# Patient Record
Sex: FEMALE | Race: WHITE | NOT HISPANIC OR LATINO | Employment: FULL TIME | URBAN - METROPOLITAN AREA
[De-identification: names, ages, dates, MRNs, and addresses within clinical notes are randomized per-mention and may not be internally consistent; named-entity substitution may affect disease eponyms.]

---

## 2017-01-24 ENCOUNTER — OFFICE VISIT (OUTPATIENT)
Dept: FAMILY MEDICINE | Facility: CLINIC | Age: 52
End: 2017-01-24
Payer: COMMERCIAL

## 2017-01-24 VITALS
HEART RATE: 84 BPM | OXYGEN SATURATION: 97 % | SYSTOLIC BLOOD PRESSURE: 131 MMHG | DIASTOLIC BLOOD PRESSURE: 78 MMHG | BODY MASS INDEX: 33.22 KG/M2 | WEIGHT: 191 LBS

## 2017-01-24 DIAGNOSIS — F41.8 DEPRESSION WITH ANXIETY: Primary | ICD-10-CM

## 2017-01-24 PROCEDURE — 99214 OFFICE O/P EST MOD 30 MIN: CPT | Performed by: PHYSICIAN ASSISTANT

## 2017-01-24 RX ORDER — CITALOPRAM HYDROBROMIDE 20 MG/1
TABLET ORAL
Qty: 30 TABLET | Refills: 1 | Status: SHIPPED | OUTPATIENT
Start: 2017-01-24 | End: 2017-03-25

## 2017-01-24 ASSESSMENT — ANXIETY QUESTIONNAIRES
7. FEELING AFRAID AS IF SOMETHING AWFUL MIGHT HAPPEN: NOT AT ALL
5. BEING SO RESTLESS THAT IT IS HARD TO SIT STILL: NOT AT ALL
3. WORRYING TOO MUCH ABOUT DIFFERENT THINGS: NOT AT ALL
GAD7 TOTAL SCORE: 3
1. FEELING NERVOUS, ANXIOUS, OR ON EDGE: NOT AT ALL
2. NOT BEING ABLE TO STOP OR CONTROL WORRYING: NOT AT ALL
6. BECOMING EASILY ANNOYED OR IRRITABLE: NEARLY EVERY DAY
IF YOU CHECKED OFF ANY PROBLEMS ON THIS QUESTIONNAIRE, HOW DIFFICULT HAVE THESE PROBLEMS MADE IT FOR YOU TO DO YOUR WORK, TAKE CARE OF THINGS AT HOME, OR GET ALONG WITH OTHER PEOPLE: NOT DIFFICULT AT ALL

## 2017-01-24 ASSESSMENT — PATIENT HEALTH QUESTIONNAIRE - PHQ9: 5. POOR APPETITE OR OVEREATING: NOT AT ALL

## 2017-01-24 NOTE — MR AVS SNAPSHOT
After Visit Summary   1/24/2017    Yue Mays    MRN: [id number]           Patient Information     Date Of Birth          1965        Visit Information        Provider Department      1/24/2017 6:40 PM Kaylynn Cameron PA-C [facility]        Today's Diagnoses     Depression with anxiety    -  1       Care Instructions    Take 300 mg wellbutrin for 1-2 weeks, then decrease to 150 mg for 2-3 weeks then stop if feeling okay.    Start celexa as prescribed.         Follow-ups after your visit        Who to contact     If you have questions or need follow up information about today's clinic visit or your schedule please contact [facility] directly at [phone].  Normal or non-critical lab and imaging results will be communicated to you by Cloakroom[employer], letter or phone within 4 business days after the clinic has received the results. If you do not hear from us within 7 days, please contact the clinic through Cloakroom[employer] or phone. If you have a critical or abnormal lab result, we will notify you by phone as soon as possible.  Submit refill requests through [employer] Direct or call your pharmacy and they will forward the refill request to us. Please allow 3 business days for your refill to be completed.          Additional Information About Your Visit        MyChart Information     [employer] gives you secure access to your electronic health record. If you see a primary care provider, you can also send messages to your care team and make appointments. If you have questions, please call your primary care clinic.  If you do not have a primary care provider, please call [phone] and they will assist you.        Care EveryWhere ID     This is your Care EveryWhere ID. This could be used by other organizations to access your [place] medical records  [id number]        Your Vitals Were     Pulse Pulse Oximetry                84 97%           Blood Pressure from Last 3 Encounters:    01/24/17 131/78   07/11/16 134/83   06/16/16 123/79    Weight from Last 3 Encounters:   01/24/17 191 lb (86.637 kg)   07/11/16 199 lb (90.266 kg)   06/16/16 196 lb 6.4 oz (89.086 kg)              Today, you had the following     No orders found for display         Today's Medication Changes          These changes are accurate as of: 1/24/17  7:17 PM.  If you have any questions, ask your nurse or doctor.               Start taking these medicines.        Dose/Directions    citalopram 20 MG tablet   Commonly known as:  celeXA   Used for:  Depression with anxiety        Take 1/2 tablet (10 mg) for 1-2 weeks, then increase to 1 tablet orally daily   Quantity:  30 tablet   Refills:  1         These medicines have changed or have updated prescriptions.        Dose/Directions    * buPROPion 300 MG 24 hr tablet   Commonly known as:  WELLBUTRIN XL   This may have changed:  Another medication with the same name was removed. Continue taking this medication, and follow the directions you see here.   Used for:  Depression with anxiety        Dose:  300 mg   Take 1 tablet (300 mg) by mouth every morning   Quantity:  90 tablet   Refills:  0       * buPROPion 150 MG 24 hr tablet   Commonly known as:  WELLBUTRIN XL   This may have changed:  Another medication with the same name was removed. Continue taking this medication, and follow the directions you see here.   Used for:  Depression with anxiety        Dose:  150 mg   Take 1 tablet (150 mg) by mouth every morning Take with 300 for total of 450.   Quantity:  90 tablet   Refills:  0       * Notice:  This list has 2 medication(s) that are the same as other medications prescribed for you. Read the directions carefully, and ask your doctor or other care provider to review them with you.         Where to get your medicines      These medications were sent to [facility]/pharmacy #[id number] - [place], [place] - [address] [place] [address],  AT CORNER OF [facility]  [address] [place] [address], ,  [facility] [address]     Phone:  [phone]    - citalopram 20 MG tablet             Primary Care Provider Office Phone # Fax #    Kaylynn Cameron PA-C [phone]       [facility] [address] [facility] [address]        Thank you!     Thank you for choosing [facility]  for your care. Our goal is always to provide you with excellent care. Hearing back from our patients is one way we can continue to improve our services. Please take a few minutes to complete the written survey that you may receive in the mail after your visit with us. Thank you!             Your Updated Medication List - Protect others around you: Learn how to safely use, store and throw away your medicines at www.disposemymeds.org.          This list is accurate as of: 1/24/17  7:17 PM.  Always use your most recent med list.                   Brand Name Dispense Instructions for use    * buPROPion 300 MG 24 hr tablet    WELLBUTRIN XL    90 tablet    Take 1 tablet (300 mg) by mouth every morning       * buPROPion 150 MG 24 hr tablet    WELLBUTRIN XL    90 tablet    Take 1 tablet (150 mg) by mouth every morning Take with 300 for total of 450.       citalopram 20 MG tablet    celeXA    30 tablet    Take 1/2 tablet (10 mg) for 1-2 weeks, then increase to 1 tablet orally daily       * Notice:  This list has 2 medication(s) that are the same as other medications prescribed for you. Read the directions carefully, and ask your doctor or other care provider to review them with you.

## 2017-01-25 ASSESSMENT — PATIENT HEALTH QUESTIONNAIRE - PHQ9: SUM OF ALL RESPONSES TO PHQ QUESTIONS 1-9: 5

## 2017-01-25 ASSESSMENT — ANXIETY QUESTIONNAIRES: GAD7 TOTAL SCORE: 3

## 2017-01-25 NOTE — PATIENT INSTRUCTIONS
Take 300 mg wellbutrin for 1-2 weeks, then decrease to 150 mg for 2-3 weeks then stop if feeling okay.    Start celexa as prescribed.

## 2017-01-25 NOTE — NURSING NOTE
"Chief Complaint   Patient presents with     Anxiety       Initial /78 mmHg  Pulse 84  Wt 191 lb (86.637 kg)  SpO2 97% Estimated body mass index is 33.22 kg/(m^2) as calculated from the following:    Height as of 6/16/16: 5' 3.58\" (1.615 m).    Weight as of this encounter: 191 lb (86.637 kg)..  BP completed using cuff size: lisbeth Palomino M.A.  PHQ-9 score:    PHQ-9 SCORE 1/24/2017   Total Score -   Total Score MyChart -   Total Score 5     NAIN-7 SCORE 1/20/2015 6/16/2016 1/24/2017   Total Score 0 - -   Total Score - 5 3                 "

## 2017-01-25 NOTE — PROGRESS NOTES
SUBJECTIVE:                                                    Yue Mays is a 51 year old female who presents to clinic today for the following health issues:    Recheck anxiety:    Medication Followup of wellbutrin    Taking Medication as prescribed: yes    Side Effects:  None    Medication Helping Symptoms: feel like it is not helping symptoms anymore     Previously on wellbutrin, celexa, lexapro. lexapro made her tired.  felt like Wellbutrin helped her the most in the past.   This time not helping much. celexa helped but also made her tired for the first 2 months. Wants to try again however.  Sleeping okay. No drug abuse.     Denies suicidal or homicidal thoughts.  Patient instructed to go to the emergency room or call 911 if these occur.    Has used to therapy in the past, was helpful at that time.    Does not feel therapy would be helpful now.     Has not met with psychiatry previously.           Problem list and histories reviewed & adjusted, as indicated.  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     Cystocele, midline     Urgency of urination     Female stress incontinence     Cystocele     Papanicolaou smear of cervix with low grade squamous intraepithelial lesion (LGSIL)     Depression with anxiety     Past Surgical History   Procedure Laterality Date     Colporrhaphy anterior  8/21/2012     Procedure: COLPORRHAPHY ANTERIOR;  Cardinal Ligament Repair with Anterior Colporrhaphy and a Midurethral Sling;  Surgeon: Valentine Vrea MD;  Location: [facility] OR     Sling transvaginal  8/21/2012     Procedure: SLING TRANSVAGINAL;;  Surgeon: Valentine Vera MD;  Location: [facility] OR     Sling transvaginal  11/12/2012     Procedure: SLING TRANSVAGINAL;  Cysto with TVT (transobturator approach);  Surgeon: Valentine Vera MD;  Location: [facility] OR     Biopsy  1989     had  a 2 colposcopies for moderate displaysia     Genitourinary surgery  8/2013     3 slings put in     Colonoscopy N/A 12/31/2015     Procedure: COLONOSCOPY;  Surgeon: Arpit Tony MD;  Location: [facility] OR     Colonoscopy with co2 insufflation N/A 12/31/2015     Procedure: COLONOSCOPY WITH CO2 INSUFFLATION;  Surgeon: Arpit Tony MD;  Location: [facility] OR     Colonoscopy N/A 12/31/2015     Procedure: COMBINED COLONOSCOPY, SINGLE OR MULTIPLE BIOPSY/POLYPECTOMY BY BIOPSY;  Surgeon: Arpit Tony MD;  Location: [facility] OR       Social History   Substance Use Topics     Smoking status: Never Smoker      Smokeless tobacco: Never Used     Alcohol Use: 0.0 oz/week     0 Standard drinks or equivalent per week      Comment: minimal - maybe once a month     Family History   Problem Relation Age of Onset     CANCER Mother      hodgkins     Arthritis Father      DIABETES Father      Hypertension Father      Breast Cancer Mother      early 30s      Depression/Anxiety Father      undiagnosed     Depression/Anxiety Sister      undiagnosed         Current Outpatient Prescriptions   Medication Sig Dispense Refill     citalopram (CELEXA) 20 MG tablet Take 1/2 tablet (10 mg) for 1-2 weeks, then increase to 1 tablet orally daily 30 tablet 1     buPROPion (WELLBUTRIN XL) 150 MG 24 hr tablet Take 1 tablet (150 mg) by mouth every morning Take with 300 for total of 450. 90 tablet 0     buPROPion (WELLBUTRIN XL) 300 MG 24 hr tablet Take 1 tablet (300 mg) by mouth every morning 90 tablet 0     Allergies   Allergen Reactions     Nkda [No Known Drug Allergies]        ROS:  Constitutional, HEENT, cardiovascular, pulmonary, gi and gu systems are negative, except as otherwise noted.    OBJECTIVE:                                                    /78 mmHg  Pulse 84  Wt 191 lb (86.637 kg)  SpO2 97%  Body mass index is 33.22 kg/(m^2).  GENERAL: healthy, alert and no distress  RESP: lungs clear to auscultation - no rales,  rhonchi or wheezes  CV: regular rate and rhythm, normal S1 S2, no S3 or S4, no murmur, click or rub, no peripheral edema and peripheral pulses strong  MS: no gross musculoskeletal defects noted, no edema  NEURO: Normal strength and tone, mentation intact and speech normal  PSYCH: mentation appears normal, affect normal/bright    Diagnostic Test Results:  none      ASSESSMENT/PLAN:                                                    ASSESSMENT / PLAN:  (F41.8) Depression with anxiety  (primary encounter diagnosis)  Comment: worsening on wellbutrin.  Suggested adding celexa, patient would prefer to taper off wellbutrin.  Taper as below.  Taper up on celexa. Expectations and side effects discussed. Recheck via my chart in 3 weeks, sooner if needed.  Recheck in clinic in 2 months unless doing very well on this new regime.   Plan: citalopram (CELEXA) 20 MG tablet            Patient Instructions   Take 300 mg wellbutrin for 1-2 weeks, then decrease to 150 mg for 2-3 weeks then stop if feeling okay.    Start celexa as prescribed.         Kaylynn Cameron PA-C  [facility]

## 2017-03-25 DIAGNOSIS — F41.8 DEPRESSION WITH ANXIETY: ICD-10-CM

## 2017-03-28 NOTE — TELEPHONE ENCOUNTER
Could help possibly. If wanting a dose change please have her start an e-visit with me to do so. Thanksradha

## 2017-03-28 NOTE — TELEPHONE ENCOUNTER
Pt completed PHQ-9 with score of 4.  Pt feels like her medication is working.  Pt states her only concern is she has a lack of motivation at home, she does ok getting to work and working but it is hard to do things at home.  Pt asking if she needs med increase to help this?  Marcia Rosas RN

## 2017-03-29 RX ORDER — CITALOPRAM HYDROBROMIDE 20 MG/1
TABLET ORAL
Qty: 90 TABLET | Refills: 1 | Status: SHIPPED | OUTPATIENT
Start: 2017-03-29 | End: 2017-10-07

## 2017-03-29 ASSESSMENT — PATIENT HEALTH QUESTIONNAIRE - PHQ9: SUM OF ALL RESPONSES TO PHQ QUESTIONS 1-9: 4

## 2017-03-29 NOTE — TELEPHONE ENCOUNTER
Pt notified of provider message as written.  Pt verbalized good understanding.  Marcia Rosas RN

## 2017-05-19 ENCOUNTER — TELEPHONE (OUTPATIENT)
Dept: FAMILY MEDICINE | Facility: CLINIC | Age: 52
End: 2017-05-19

## 2017-05-19 DIAGNOSIS — Z11.3 SCREEN FOR STD (SEXUALLY TRANSMITTED DISEASE): Primary | ICD-10-CM

## 2017-05-19 NOTE — TELEPHONE ENCOUNTER
Pt would like to know if she can have lab orders for HIV testing without coming in for an appointment.  Okay to leave message.

## 2017-05-21 NOTE — TELEPHONE ENCOUNTER
We can do hiv testing with lab only appt, but if anything is positive then we will have her f/u.  Does she want full blood panel (hepatitis, etc.) with it also? And chlamydia/gonorrohea?    Kaylynn Cameron PA-C

## 2017-05-22 DIAGNOSIS — Z11.3 SCREEN FOR STD (SEXUALLY TRANSMITTED DISEASE): ICD-10-CM

## 2017-05-22 PROCEDURE — 87340 HEPATITIS B SURFACE AG IA: CPT | Performed by: PHYSICIAN ASSISTANT

## 2017-05-22 PROCEDURE — 36415 COLL VENOUS BLD VENIPUNCTURE: CPT | Performed by: PHYSICIAN ASSISTANT

## 2017-05-22 PROCEDURE — 86803 HEPATITIS C AB TEST: CPT | Performed by: PHYSICIAN ASSISTANT

## 2017-05-22 PROCEDURE — 87389 HIV-1 AG W/HIV-1&-2 AB AG IA: CPT | Performed by: PHYSICIAN ASSISTANT

## 2017-05-22 NOTE — TELEPHONE ENCOUNTER
Called and informed patient of Kaylynn's message. Patient said that she an a friend had a tattoo at the same place/day. He friend tested positive for HIV, that is why she is requesting. I pended hep C and GC/chlamydia, please order HIV, was not sure which to order. Please order any other tests that you think she may need, she would like what ever you think would be helpful. Patient is scheduled for a lab appointment today.Yee Gaona MA/TC

## 2017-05-23 LAB
HBV SURFACE AG SERPL QL IA: NONREACTIVE
HCV AB SERPL QL IA: NORMAL
HIV 1+2 AB+HIV1 P24 AG SERPL QL IA: NORMAL

## 2017-05-24 NOTE — PROGRESS NOTES
Rachel Turner,       Your recent test results are attached, if you have any questions or concerns please feel free to contact me via e-mail or call [phone].  Negative HIV and hepatitis panel.       Sincerely,  Kaylynn Cameron PA-C

## 2017-09-07 ENCOUNTER — TELEPHONE (OUTPATIENT)
Dept: FAMILY MEDICINE | Facility: CLINIC | Age: 52
End: 2017-09-07

## 2017-09-22 ENCOUNTER — RADIANT APPOINTMENT (OUTPATIENT)
Dept: MAMMOGRAPHY | Facility: CLINIC | Age: 52
End: 2017-09-22
Payer: COMMERCIAL

## 2017-09-22 DIAGNOSIS — Z12.31 VISIT FOR SCREENING MAMMOGRAM: ICD-10-CM

## 2017-09-22 PROCEDURE — G0202 SCR MAMMO BI INCL CAD: HCPCS | Mod: TC

## 2017-10-07 DIAGNOSIS — F41.8 DEPRESSION WITH ANXIETY: ICD-10-CM

## 2017-10-08 ENCOUNTER — HEALTH MAINTENANCE LETTER (OUTPATIENT)
Age: 52
End: 2017-10-08

## 2017-10-10 RX ORDER — CITALOPRAM HYDROBROMIDE 20 MG/1
20 TABLET ORAL DAILY
Qty: 90 TABLET | Refills: 0 | Status: SHIPPED | OUTPATIENT
Start: 2017-10-10 | End: 2017-11-20

## 2017-10-10 ASSESSMENT — PATIENT HEALTH QUESTIONNAIRE - PHQ9: SUM OF ALL RESPONSES TO PHQ QUESTIONS 1-9: 4

## 2017-10-10 NOTE — TELEPHONE ENCOUNTER
Pt completed PHQ-9 with score of 4, pt states she is not sure she is doing as well as she could be, her daughter is on a different med and she may want to try that instead.  Advised ov to discuss.  Pt states she needs physical also, advised can do both at same appointment.  Marcia Rosas RN

## 2017-11-16 NOTE — PATIENT INSTRUCTIONS
Take half tablet celexa for 2 weeks then stop  Start fluoxetine in 1 week   Recheck 2 months, sooner if needed      Preventive Health Recommendations  Female Ages 50 - 64    Yearly exam: See your health care provider every year in order to  o Review health changes.   o Discuss preventive care.    o Review your medicines if your doctor has prescribed any.      Get a Pap test every three years (unless you have an abnormal result and your provider advises testing more often).    If you get Pap tests with HPV test, you only need to test every 5 years, unless you have an abnormal result.     You do not need a Pap test if your uterus was removed (hysterectomy) and you have not had cancer.    You should be tested each year for STDs (sexually transmitted diseases) if you're at risk.     Have a mammogram every 1 to 2 years.    Have a colonoscopy at age 50, or have a yearly FIT test (stool test). These exams screen for colon cancer.      Have a cholesterol test every 5 years, or more often if advised.    Have a diabetes test (fasting glucose) every three years. If you are at risk for diabetes, you should have this test more often.     If you are at risk for osteoporosis (brittle bone disease), think about having a bone density scan (DEXA).    Shots: Get a flu shot each year. Get a tetanus shot every 10 years.    Nutrition:     Eat at least 5 servings of fruits and vegetables each day.    Eat whole-grain bread, whole-wheat pasta and brown rice instead of white grains and rice.    Talk to your provider about Calcium and Vitamin D.     Lifestyle    Exercise at least 150 minutes a week (30 minutes a day, 5 days a week). This will help you control your weight and prevent disease.    Limit alcohol to one drink per day.    No smoking.     Wear sunscreen to prevent skin cancer.     See your dentist every six months for an exam and cleaning.    See your eye doctor every 1 to 2 years.

## 2017-11-16 NOTE — PROGRESS NOTES
SUBJECTIVE:   CC: Yue Mays is an 52 year old woman who presents for preventive health visit.     Healthy Habits:    Answers for HPI/ROS submitted by the patient on 11/15/2017   Annual Exam:  Getting at least 3 servings of Calcium per day:: Yes  Bi-annual eye exam:: Yes  Dental care twice a year:: Yes  Sleep apnea or symptoms of sleep apnea:: None  Diet:: Regular (no restrictions)  Frequency of exercise:: 4-5 days/week  Taking medications regularly:: Yes  Medication side effects:: Not applicable  Additional concerns today:: YES  PHQ-2 Score: 0  Duration of exercise:: 30-45 minutes    Pt is due for PAP, have not had it done elsewhere. Has light period, she still wants pap done today.  She is aware it may have to be recollected if she has too much bleeding during collection.     Mammogram done, had normal fasting labs last year, colonoscopy is up to date.  She has several concerns she wants to address today, and is upset that we have limited time.  She wants both her physical and to address her several concerns.  She is aware that this will be an additional cost.       1)  Ongoing Depression:  She feels it is worsening.  Noticing she is having more bad days where she feels down.  Previously celexa 20 mg worked well for her but not anymore she feels.  She does not want to increase the dose but rather wants to try something else.  Has also previously been on wellbutrin.   Daughter is on fluoxetine and it works well. Patient is interested in trying this.      Denies suicidal or homicidal thoughts.  Patient instructed to go to the emergency room or call 911 if these occur.     2) early satiety-patient is morbidly obese.  She complains that since she has been trying to eat healthier (more vegetables and fiber) she gets more full faster.  We discussed this could be due to healthier foods.  She denies heartburn or gerd. No fevers, chills, night sweats, or unintentional weight loss.  No h/o cancer or alcohol abuse. No  blood in stool.  Up to date on colonoscopy.  Sometimes she gets diarrhea.       3)  shortness of breath when going up stairs or walking x a few months no chest pain or jaw pain or sweating.  No arm pain.  No recent long car trip or airplane trips  No lower extremity edema, she would be low risk for PE.  Nonsmoker.  No cough. No palpitations.  No cardiac or lung history for patient.                   Today's PHQ-2 Score: PHQ-2 ( 1999 Pfizer) 11/15/2017 6/16/2016   Q1: Little interest or pleasure in doing things 0 3   Q2: Feeling down, depressed or hopeless 0 3   PHQ-2 Score 0 6   Q1: Little interest or pleasure in doing things Not at all -   Q2: Feeling down, depressed or hopeless Not at all -   PHQ-2 Score 0 -         Abuse: Current or Past(Physical, Sexual or Emotional)- No  Do you feel safe in your environment - Yes  Social History   Substance Use Topics     Smoking status: Never Smoker     Smokeless tobacco: Never Used     Alcohol use 0.0 oz/week      Comment: minimal - maybe once a month     The patient does not drink >3 drinks per day nor >7 drinks per week.    Reviewed orders with patient.  Reviewed health maintenance and updated orders accordingly - Yes  Labs reviewed in EPIC  BP Readings from Last 3 Encounters:   11/20/17 130/83   01/24/17 131/78   07/11/16 134/83    Wt Readings from Last 3 Encounters:   11/20/17 220 lb (99.8 kg)   01/24/17 191 lb (86.6 kg)   07/11/16 199 lb (90.3 kg)                  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     Cystocele, midline     Urgency of urination     Female stress incontinence     Cystocele     Papanicolaou smear of cervix with low grade squamous intraepithelial lesion (LGSIL)     Depression with anxiety     Past Surgical History:   Procedure Laterality Date     BIOPSY  1989    had a 2 colposcopies for moderate displaysia     COLONOSCOPY N/A 12/31/2015     Procedure: COLONOSCOPY;  Surgeon: Arpit Tony MD;  Location: [facility]     COLONOSCOPY N/A 12/31/2015    Procedure: COMBINED COLONOSCOPY, SINGLE OR MULTIPLE BIOPSY/POLYPECTOMY BY BIOPSY;  Surgeon: Arpit Tony MD;  Location: [facility]     COLONOSCOPY WITH CO2 INSUFFLATION N/A 12/31/2015    Procedure: COLONOSCOPY WITH CO2 INSUFFLATION;  Surgeon: Arpit Tony MD;  [facility]     COLPORRHAPHY ANTERIOR  8/21/2012    Procedure: COLPORRHAPHY ANTERIOR;  Cardinal Ligament Repair with Anterior Colporrhaphy and a Midurethral Sling;  Surgeon: Valentine Vera MD;  [facility]     GENITOURINARY SURGERY  8/2013    3 slings put in     SLING TRANSVAGINAL  8/21/2012    Procedure: SLING TRANSVAGINAL;;  Surgeon: Valentine Vera MD;  [facility]     SLING TRANSVAGINAL  11/12/2012    Procedure: SLING TRANSVAGINAL;  Cysto with TVT (transobturator approach);  Surgeon: Valentine Vera MD;  Location: [facility]       Social History   Substance Use Topics     Smoking status: Never Smoker     Smokeless tobacco: Never Used     Alcohol use 0.0 oz/week      Comment: minimal - maybe once a month     Family History   Problem Relation Age of Onset     CANCER Mother      hodgkins     Breast Cancer Mother      early 30s      Other Cancer Mother      Hodgkin's Disease, Breast Cancer     Arthritis Father      DIABETES Father      I don't recall if he had diabetes     Hypertension Father      Depression/Anxiety Father      undiagnosed     Depression/Anxiety Sister      undiagnosed     Depression Daughter      Anxiety Disorder Daughter      Depression Daughter      Anxiety Disorder Daughter          Current Outpatient Prescriptions   Medication Sig Dispense Refill     FLUoxetine (PROZAC) 10 MG capsule Take 1 capsule for 1 week daily then increase to 2 capsules daily for 2 weeks then increase to 3 capsules daily if tolerated 42 capsule 0     Allergies   Allergen Reactions     Nkda [No Known Drug Allergies]             Patient over age 50, mutual decision to screen reflected in health maintenance.      Pertinent mammograms are reviewed under the imaging tab.  History of abnormal Pap smear: YES - updated in Problem List and Health Maintenance accordingly    Reviewed and updated as needed this visit by clinical staff  Tobacco  Allergies  Meds  Med Hx  Surg Hx  Fam Hx  Soc Hx        Reviewed and updated as needed this visit by Provider        Past Medical History:   Diagnosis Date     Anxiety      ASCUS with positive high risk HPV cervical 6/16/16    Neg 16/18     Depression      Depressive disorder      H/O colposcopy with cervical biopsy 2/2015    LSIL     History of colposcopy 7/11/16    ECC - negative     LSIL (low grade squamous intraepithelial lesion) on Pap smear 1/2015    + HR HPV     Urinary incontinence       Past Surgical History:   Procedure Laterality Date     BIOPSY  1989    had a 2 colposcopies for moderate displaysia     COLONOSCOPY N/A 12/31/2015    Procedure: COLONOSCOPY;  Surgeon: Arpit Tony MD;  [facility]     COLONOSCOPY N/A 12/31/2015    Procedure: COMBINED COLONOSCOPY, SINGLE OR MULTIPLE BIOPSY/POLYPECTOMY BY BIOPSY;  Surgeon: Arpit Tony MD;  [facility]     COLONOSCOPY WITH CO2 INSUFFLATION N/A 12/31/2015    Procedure: COLONOSCOPY WITH CO2 INSUFFLATION;  Surgeon: Arpit Tony MD;  [facility]     COLPORRHAPHY ANTERIOR  8/21/2012    Procedure: COLPORRHAPHY ANTERIOR;  Cardinal Ligament Repair with Anterior Colporrhaphy and a Midurethral Sling;  Surgeon: Valentine Vera MD;  [facility]     GENITOURINARY SURGERY  8/2013    3 slings put in     SLING TRANSVAGINAL  8/21/2012    Procedure: SLING TRANSVAGINAL;;  Surgeon: Valentine Vera MD;  [facility]     SLING TRANSVAGINAL  11/12/2012    Procedure: SLING TRANSVAGINAL;  Cysto with TVT (transobturator approach);  Surgeon: Valentine Vera MD;  [facility]     Obstetric History    G4  "  P2   T0      L0     SAB0   TAB0   Ectopic0   Multiple0   Live Births0       # Outcome Date GA Lbr Ethan/2nd Weight Sex Delivery Anes PTL Lv   4             3             2 Para            1 Para               Obstetric Comments   2 miscarriages       ROS:  C: NEGATIVE for fever, chills, change in weight  I: NEGATIVE for worrisome rashes, moles or lesions  E: NEGATIVE for vision changes or irritation  ENT: NEGATIVE for ear, mouth and throat problems  B: NEGATIVE for masses, tenderness or discharge  CV: NEGATIVE for chest pain, palpitations or peripheral edema  GI: NEGATIVE for nausea, abdominal pain, heartburn, or change in bowel habits  : NEGATIVE for unusual urinary or vaginal symptoms. Periods are regular.  M: NEGATIVE for significant arthralgias or myalgia  N: NEGATIVE for weakness, dizziness or paresthesias    OBJECTIVE:   /83  Pulse 79  Temp 98.2  F (36.8  C) (Oral)  Ht 5' 3\" (1.6 m)  Wt 220 lb (99.8 kg)  SpO2 95%  Breastfeeding? No  BMI 38.97 kg/m2  EXAM:  GENERAL: healthy, alert and no distress  EYES: Eyes grossly normal to inspection, PERRL and conjunctivae and sclerae normal  HENT: ear canals and TM's normal, nose and mouth without ulcers or lesions  NECK: no adenopathy, no asymmetry, masses, or scars and thyroid normal to palpation  RESP: lungs clear to auscultation - no rales, rhonchi or wheezes  BREAST: normal without masses, tenderness or nipple discharge and no palpable axillary masses or adenopathy  CV: regular rate and rhythm, normal S1 S2, no S3 or S4, no murmur, click or rub, no peripheral edema and peripheral pulses strong  ABDOMEN: soft, nontender, no hepatosplenomegaly, no masses and bowel sounds normal   (female): normal female external genitalia, normal urethral meatus, vaginal mucosa pink, moist, well rugated, and normal cervix/adnexa/uterus without masses or discharge. Moderate amount of blood present, patient is currently menstruating but wanted to do " pap knowing she may need to repeat it if results cannot be interpreted.   MS: no gross musculoskeletal defects noted, no edema  SKIN: no suspicious lesions or rashes  NEURO: Normal strength and tone, mentation intact and speech normal  PSYCH: mentation appears normal, affect normal/bright    EKG-normal  Labs-pending    Results for orders placed or performed in visit on 11/20/17 (from the past 24 hour(s))   TSH with free T4 reflex   Result Value Ref Range    TSH 1.69 0.40 - 4.00 mU/L   Comprehensive metabolic panel   Result Value Ref Range    Sodium 139 133 - 144 mmol/L    Potassium 4.1 3.4 - 5.3 mmol/L    Chloride 103 94 - 109 mmol/L    Carbon Dioxide 25 20 - 32 mmol/L    Anion Gap 11 3 - 14 mmol/L    Glucose 72 70 - 99 mg/dL    Urea Nitrogen 8 7 - 30 mg/dL    Creatinine 0.66 0.52 - 1.04 mg/dL    GFR Estimate >90 >60 mL/min/1.7m2    GFR Estimate If Black >90 >60 mL/min/1.7m2    Calcium 8.8 8.5 - 10.1 mg/dL    Bilirubin Total 0.2 0.2 - 1.3 mg/dL    Albumin 3.4 3.4 - 5.0 g/dL    Protein Total 7.2 6.8 - 8.8 g/dL    Alkaline Phosphatase 85 40 - 150 U/L    ALT 18 0 - 50 U/L    AST 18 0 - 45 U/L   CBC with platelets differential   Result Value Ref Range    WBC 6.4 4.0 - 11.0 10e9/L    RBC Count 4.82 3.8 - 5.2 10e12/L    Hemoglobin 14.6 11.7 - 15.7 g/dL    Hematocrit 44.0 35.0 - 47.0 %    MCV 91 78 - 100 fl    MCH 30.3 26.5 - 33.0 pg    MCHC 33.2 31.5 - 36.5 g/dL    RDW 13.0 10.0 - 15.0 %    Platelet Count 292 150 - 450 10e9/L    Diff Method Automated Method     % Neutrophils 59.3 %    % Lymphocytes 32.5 %    % Monocytes 6.9 %    % Eosinophils 1.1 %    % Basophils 0.2 %    Absolute Neutrophil 3.8 1.6 - 8.3 10e9/L    Absolute Lymphocytes 2.1 0.8 - 5.3 10e9/L    Absolute Monocytes 0.4 0.0 - 1.3 10e9/L    Absolute Eosinophils 0.1 0.0 - 0.7 10e9/L    Absolute Basophils 0.0 0.0 - 0.2 10e9/L         ASSESSMENT/PLAN:   1. Encounter for routine adult health examination with abnormal findings    - Pap imaged thin layer screen with  "HPV - recommended age 30 - 65 years (select HPV order below)  - HPV High Risk Types DNA Cervical    2. SOB (shortness of breath)  Likely due to deconditioning of patient and obesity.  Exam of lungs and heart normal, EKG normal.  If symptoms worsen or continue we could consider a stress echo.  We will also check thyroid and hg to rule out low levels of these as cause. To emergency room with worsening symptoms or call 911.   - TSH with free T4 reflex  - Comprehensive metabolic panel  - CBC with platelets differential  - EKG 12-lead complete w/read - Clinics    3. Depression with anxiety  Worsening, see below  - FLUoxetine (PROZAC) 10 MG capsule; Take 1 capsule for 1 week daily then increase to 2 capsules daily for 2 weeks then increase to 3 capsules daily if tolerated  Dispense: 42 capsule; Refill: 0    4. Early satiety    If labs all negative will refer to gastroenterology, consider upper endoscopy    5. Need for Tdap vaccination    - TDAP VACCINE (ADACEL)    COUNSELING:   Reviewed preventive health counseling, as reflected in patient instructions       Regular exercise       Healthy diet/nutrition       Vision screening       Hearing screening    BP Screening:   Last 3 BP Readings:    BP Readings from Last 3 Encounters:   11/20/17 130/83   01/24/17 131/78   07/11/16 134/83       The following was recommended to the patient:  Re-screen BP within a year and recommended lifestyle modifications     reports that she has never smoked. She has never used smokeless tobacco.    Estimated body mass index is 38.97 kg/(m^2) as calculated from the following:    Height as of this encounter: 5' 3\" (1.6 m).    Weight as of this encounter: 220 lb (99.8 kg).   Weight management plan: Discussed healthy diet and exercise guidelines and patient will follow up in 12 months in clinic to re-evaluate.    Counseling Resources:  ATP IV Guidelines  Pooled Cohorts Equation Calculator  Breast Cancer Risk Calculator  FRAX Risk Assessment  ICSI " Preventive Guidelines  Dietary Guidelines for Americans, 2010  USDA's MyPlate  ASA Prophylaxis  Lung CA Screening    Patient Instructions   Take half tablet celexa for 2 weeks then stop  Start fluoxetine in 1 week   Recheck 2 months, sooner if needed      Preventive Health Recommendations  Female Ages 50 - 64    Yearly exam: See your health care provider every year in order to  o Review health changes.   o Discuss preventive care.    o Review your medicines if your doctor has prescribed any.      Get a Pap test every three years (unless you have an abnormal result and your provider advises testing more often).    If you get Pap tests with HPV test, you only need to test every 5 years, unless you have an abnormal result.     You do not need a Pap test if your uterus was removed (hysterectomy) and you have not had cancer.    You should be tested each year for STDs (sexually transmitted diseases) if you're at risk.     Have a mammogram every 1 to 2 years.    Have a colonoscopy at age 50, or have a yearly FIT test (stool test). These exams screen for colon cancer.      Have a cholesterol test every 5 years, or more often if advised.    Have a diabetes test (fasting glucose) every three years. If you are at risk for diabetes, you should have this test more often.     If you are at risk for osteoporosis (brittle bone disease), think about having a bone density scan (DEXA).    Shots: Get a flu shot each year. Get a tetanus shot every 10 years.    Nutrition:     Eat at least 5 servings of fruits and vegetables each day.    Eat whole-grain bread, whole-wheat pasta and brown rice instead of white grains and rice.    Talk to your provider about Calcium and Vitamin D.     Lifestyle    Exercise at least 150 minutes a week (30 minutes a day, 5 days a week). This will help you control your weight and prevent disease.    Limit alcohol to one drink per day.    No smoking.     Wear sunscreen to prevent skin cancer.     See your  dentist every six months for an exam and cleaning.    See your eye doctor every 1 to 2 years.          Kaylynn Cameron PA-C  [facility]

## 2017-11-20 ENCOUNTER — OFFICE VISIT (OUTPATIENT)
Dept: FAMILY MEDICINE | Facility: CLINIC | Age: 52
End: 2017-11-20
Payer: COMMERCIAL

## 2017-11-20 VITALS
HEART RATE: 79 BPM | WEIGHT: 220 LBS | BODY MASS INDEX: 38.98 KG/M2 | TEMPERATURE: 98.2 F | OXYGEN SATURATION: 95 % | SYSTOLIC BLOOD PRESSURE: 130 MMHG | HEIGHT: 63 IN | DIASTOLIC BLOOD PRESSURE: 83 MMHG

## 2017-11-20 DIAGNOSIS — Z00.01 ENCOUNTER FOR ROUTINE ADULT HEALTH EXAMINATION WITH ABNORMAL FINDINGS: Primary | ICD-10-CM

## 2017-11-20 DIAGNOSIS — F41.8 DEPRESSION WITH ANXIETY: ICD-10-CM

## 2017-11-20 DIAGNOSIS — R68.81 EARLY SATIETY: ICD-10-CM

## 2017-11-20 DIAGNOSIS — R06.02 SOB (SHORTNESS OF BREATH): ICD-10-CM

## 2017-11-20 DIAGNOSIS — Z23 NEED FOR TDAP VACCINATION: ICD-10-CM

## 2017-11-20 LAB
ALBUMIN SERPL-MCNC: 3.4 G/DL (ref 3.4–5)
ALP SERPL-CCNC: 85 U/L (ref 40–150)
ALT SERPL W P-5'-P-CCNC: 18 U/L (ref 0–50)
ANION GAP SERPL CALCULATED.3IONS-SCNC: 11 MMOL/L (ref 3–14)
AST SERPL W P-5'-P-CCNC: 18 U/L (ref 0–45)
BASOPHILS # BLD AUTO: 0 10E9/L (ref 0–0.2)
BASOPHILS NFR BLD AUTO: 0.2 %
BILIRUB SERPL-MCNC: 0.2 MG/DL (ref 0.2–1.3)
BUN SERPL-MCNC: 8 MG/DL (ref 7–30)
CALCIUM SERPL-MCNC: 8.8 MG/DL (ref 8.5–10.1)
CHLORIDE SERPL-SCNC: 103 MMOL/L (ref 94–109)
CO2 SERPL-SCNC: 25 MMOL/L (ref 20–32)
CREAT SERPL-MCNC: 0.66 MG/DL (ref 0.52–1.04)
DIFFERENTIAL METHOD BLD: NORMAL
EOSINOPHIL # BLD AUTO: 0.1 10E9/L (ref 0–0.7)
EOSINOPHIL NFR BLD AUTO: 1.1 %
ERYTHROCYTE [DISTWIDTH] IN BLOOD BY AUTOMATED COUNT: 13 % (ref 10–15)
GFR SERPL CREATININE-BSD FRML MDRD: >90 ML/MIN/1.7M2
GLUCOSE SERPL-MCNC: 72 MG/DL (ref 70–99)
HCT VFR BLD AUTO: 44 % (ref 35–47)
HGB BLD-MCNC: 14.6 G/DL (ref 11.7–15.7)
LYMPHOCYTES # BLD AUTO: 2.1 10E9/L (ref 0.8–5.3)
LYMPHOCYTES NFR BLD AUTO: 32.5 %
MCH RBC QN AUTO: 30.3 PG (ref 26.5–33)
MCHC RBC AUTO-ENTMCNC: 33.2 G/DL (ref 31.5–36.5)
MCV RBC AUTO: 91 FL (ref 78–100)
MONOCYTES # BLD AUTO: 0.4 10E9/L (ref 0–1.3)
MONOCYTES NFR BLD AUTO: 6.9 %
NEUTROPHILS # BLD AUTO: 3.8 10E9/L (ref 1.6–8.3)
NEUTROPHILS NFR BLD AUTO: 59.3 %
PLATELET # BLD AUTO: 292 10E9/L (ref 150–450)
POTASSIUM SERPL-SCNC: 4.1 MMOL/L (ref 3.4–5.3)
PROT SERPL-MCNC: 7.2 G/DL (ref 6.8–8.8)
RBC # BLD AUTO: 4.82 10E12/L (ref 3.8–5.2)
SODIUM SERPL-SCNC: 139 MMOL/L (ref 133–144)
TSH SERPL DL<=0.005 MIU/L-ACNC: 1.69 MU/L (ref 0.4–4)
WBC # BLD AUTO: 6.4 10E9/L (ref 4–11)

## 2017-11-20 PROCEDURE — 36415 COLL VENOUS BLD VENIPUNCTURE: CPT | Performed by: PHYSICIAN ASSISTANT

## 2017-11-20 PROCEDURE — 87624 HPV HI-RISK TYP POOLED RSLT: CPT | Performed by: PHYSICIAN ASSISTANT

## 2017-11-20 PROCEDURE — 99214 OFFICE O/P EST MOD 30 MIN: CPT | Mod: 25 | Performed by: PHYSICIAN ASSISTANT

## 2017-11-20 PROCEDURE — 99396 PREV VISIT EST AGE 40-64: CPT | Mod: 25 | Performed by: PHYSICIAN ASSISTANT

## 2017-11-20 PROCEDURE — G0145 SCR C/V CYTO,THINLAYER,RESCR: HCPCS | Performed by: PHYSICIAN ASSISTANT

## 2017-11-20 PROCEDURE — 80050 GENERAL HEALTH PANEL: CPT | Performed by: PHYSICIAN ASSISTANT

## 2017-11-20 PROCEDURE — 93000 ELECTROCARDIOGRAM COMPLETE: CPT | Performed by: PHYSICIAN ASSISTANT

## 2017-11-20 PROCEDURE — 90471 IMMUNIZATION ADMIN: CPT | Performed by: PHYSICIAN ASSISTANT

## 2017-11-20 PROCEDURE — 90715 TDAP VACCINE 7 YRS/> IM: CPT | Performed by: PHYSICIAN ASSISTANT

## 2017-11-20 PROCEDURE — G0124 SCREEN C/V THIN LAYER BY MD: HCPCS | Performed by: PHYSICIAN ASSISTANT

## 2017-11-20 RX ORDER — FLUOXETINE 10 MG/1
CAPSULE ORAL
Qty: 42 CAPSULE | Refills: 0 | Status: SHIPPED | OUTPATIENT
Start: 2017-11-20 | End: 2017-12-18

## 2017-11-20 NOTE — NURSING NOTE
"Chief Complaint   Patient presents with     Physical     AFE, Pt is not fasting and did not have labs done       Initial /83  Pulse 79  Temp 98.2  F (36.8  C) (Oral)  Ht 5' 3\" (1.6 m)  Wt 220 lb (99.8 kg)  SpO2 95%  Breastfeeding? No  BMI 38.97 kg/m2 Estimated body mass index is 38.97 kg/(m^2) as calculated from the following:    Height as of this encounter: 5' 3\" (1.6 m).    Weight as of this encounter: 220 lb (99.8 kg).  Medication Reconciliation: complete      Michelle Martinez MA    "

## 2017-11-20 NOTE — MR AVS SNAPSHOT
After Visit Summary   11/20/2017    Yue Mays    MRN: [id number]           Patient Information     Date Of Birth          1965        Visit Information        Provider Department      11/20/2017 12:40 PM Kaylynn Cameron PA-C [facility]        Today's Diagnoses     Encounter for routine adult health examination with abnormal findings    -  1    SOB (shortness of breath)        Depression with anxiety        Early satiety        Need for Tdap vaccination          Care Instructions    Take half tablet celexa for 2 weeks then stop  Start fluoxetine in 1 week   Recheck 2 months, sooner if needed      Preventive Health Recommendations  Female Ages 50 - 64    Yearly exam: See your health care provider every year in order to  o Review health changes.   o Discuss preventive care.    o Review your medicines if your doctor has prescribed any.      Get a Pap test every three years (unless you have an abnormal result and your provider advises testing more often).    If you get Pap tests with HPV test, you only need to test every 5 years, unless you have an abnormal result.     You do not need a Pap test if your uterus was removed (hysterectomy) and you have not had cancer.    You should be tested each year for STDs (sexually transmitted diseases) if you're at risk.     Have a mammogram every 1 to 2 years.    Have a colonoscopy at age 50, or have a yearly FIT test (stool test). These exams screen for colon cancer.      Have a cholesterol test every 5 years, or more often if advised.    Have a diabetes test (fasting glucose) every three years. If you are at risk for diabetes, you should have this test more often.     If you are at risk for osteoporosis (brittle bone disease), think about having a bone density scan (DEXA).    Shots: Get a flu shot each year. Get a tetanus shot every 10 years.    Nutrition:     Eat at least 5 servings of fruits and vegetables each day.    Eat whole-grain  "bread, whole-wheat pasta and brown rice instead of white grains and rice.    Talk to your provider about Calcium and Vitamin D.     Lifestyle    Exercise at least 150 minutes a week (30 minutes a day, 5 days a week). This will help you control your weight and prevent disease.    Limit alcohol to one drink per day.    No smoking.     Wear sunscreen to prevent skin cancer.     See your dentist every six months for an exam and cleaning.    See your eye doctor every 1 to 2 years.            Follow-ups after your visit        Who to contact     If you have questions or need follow up information about today's clinic visit or your schedule please contact [facility] directly at [phone].  Normal or non-critical lab and imaging results will be communicated to you by Prenovahart, letter or phone within 4 business days after the clinic has received the results. If you do not hear from us within 7 days, please contact the clinic through DrFirstt or phone. If you have a critical or abnormal lab result, we will notify you by phone as soon as possible.  Submit refill requests through Notorious or call your pharmacy and they will forward the refill request to us. Please allow 3 business days for your refill to be completed.          Additional Information About Your Visit        PrenovaharDiffon Information     Notorious gives you secure access to your electronic health record. If you see a primary care provider, you can also send messages to your care team and make appointments. If you have questions, please call your primary care clinic.  If you do not have a primary care provider, please call [phone] and they will assist you.        Care EveryWhere ID     This is your Care EveryWhere ID. This could be used by other organizations to access your [facility] medical records  [id number]        Your Vitals Were     Pulse Temperature Height Pulse Oximetry Breastfeeding? BMI (Body Mass Index)    79 98.2  F (36.8  C) (Oral) 5' 3\" " (1.6 m) 95% No 38.97 kg/m2       Blood Pressure from Last 3 Encounters:   11/20/17 130/83   01/24/17 131/78   07/11/16 134/83    Weight from Last 3 Encounters:   11/20/17 220 lb (99.8 kg)   01/24/17 191 lb (86.6 kg)   07/11/16 199 lb (90.3 kg)              We Performed the Following     CBC with platelets differential     Comprehensive metabolic panel     DEPRESSION ACTION PLAN (DAP)     EKG 12-lead complete w/read - Clinics     HPV High Risk Types DNA Cervical     Pap imaged thin layer screen with HPV - recommended age 30 - 65 years (select HPV order below)     TDAP VACCINE (ADACEL)     TSH with free T4 reflex          Today's Medication Changes          These changes are accurate as of: 11/20/17  1:38 PM.  If you have any questions, ask your nurse or doctor.               Start taking these medicines.        Dose/Directions    FLUoxetine 10 MG capsule   Commonly known as:  PROzac   Used for:  Depression with anxiety   Started by:  Kaylynn Cameron PA-C        Take 1 capsule for 1 week daily then increase to 2 capsules daily for 2 weeks then increase to 3 capsules daily if tolerated   Quantity:  42 capsule   Refills:  0         Stop taking these medicines if you haven't already. Please contact your care team if you have questions.     citalopram 20 MG tablet   Commonly known as:  celeXA   Stopped by:  Kaylynn Cameron PA-C                Where to get your medicines      These medications were sent to [facility]/pharmacy #[id number] - [address],  AT [address] OF [address], [facility] [address]     Phone:  [phone]     FLUoxetine 10 MG capsule                Primary Care Provider Office Phone # Fax #    Kaylynn Cameron PA-C [phone] [address] [facility] [address]        Equal Access to Services     JAROD GRANT AH: Simone Barrett, vince luke, monique odom, derek osman  james baileymillymarciano bowserDelgadoaadacia ah. So [facility] [phone].    ATENCIÓN: Si habla jayson, tiene a barry disposición servicios gratuitos de asistencia lingüística. Wing al [phone].    We comply with applicable federal civil rights laws and [place] laws. We do not discriminate on the basis of race, color, national origin, age, disability, sex, sexual orientation, or gender identity.            Thank you!     Thank you for choosing [facility]  for your care. Our goal is always to provide you with excellent care. Hearing back from our patients is one way we can continue to improve our services. Please take a few minutes to complete the written survey that you may receive in the mail after your visit with us. Thank you!             Your Updated Medication List - Protect others around you: Learn how to safely use, store and throw away your medicines at www.disposemymeds.org.          This list is accurate as of: 11/20/17  1:38 PM.  Always use your most recent med list.                   Brand Name Dispense Instructions for use Diagnosis    FLUoxetine 10 MG capsule    PROzac    42 capsule    Take 1 capsule for 1 week daily then increase to 2 capsules daily for 2 weeks then increase to 3 capsules daily if tolerated    Depression with anxiety

## 2017-11-22 NOTE — PROGRESS NOTES
Rachel Turner,       Your recent test results are attached, if you have any questions or concerns please feel free to contact me via e-mail or call [phone].   Thyroid normal. White and red blood cell counts normal.  No anemia.  Sodium, potassium, kidney and liver function normal.  Blood sugar normal, no diabetes.  No explanation for your shortness of breath. Please schedule with our internal medicine doctor Dr. Robertson if you would like to further discuss this. If your shortness of breath worsens or you get chest pain, go to the emergency room.  I also recommend losing weight to help.  Let me know if you would like a referral to a nutrionist at any time.             Sincerely,  Kaylynn Cameron PA-C

## 2017-11-27 LAB
COPATH REPORT: ABNORMAL
PAP: ABNORMAL

## 2017-11-28 LAB
FINAL DIAGNOSIS: ABNORMAL
HPV HR 12 DNA CVX QL NAA+PROBE: POSITIVE
HPV16 DNA SPEC QL NAA+PROBE: NEGATIVE
HPV18 DNA SPEC QL NAA+PROBE: NEGATIVE
SPECIMEN DESCRIPTION: ABNORMAL

## 2017-12-17 DIAGNOSIS — F41.8 DEPRESSION WITH ANXIETY: ICD-10-CM

## 2017-12-18 ENCOUNTER — MYC REFILL (OUTPATIENT)
Dept: FAMILY MEDICINE | Facility: CLINIC | Age: 52
End: 2017-12-18

## 2017-12-18 DIAGNOSIS — F41.8 DEPRESSION WITH ANXIETY: ICD-10-CM

## 2017-12-18 RX ORDER — FLUOXETINE 10 MG/1
CAPSULE ORAL
Qty: 42 CAPSULE | Refills: 0 | OUTPATIENT
Start: 2017-12-18

## 2017-12-18 RX ORDER — FLUOXETINE 10 MG/1
30 CAPSULE ORAL DAILY
Qty: 90 CAPSULE | Refills: 0 | Status: SHIPPED | OUTPATIENT
Start: 2017-12-18 | End: 2019-10-09

## 2017-12-18 NOTE — TELEPHONE ENCOUNTER
Message from [employer]:  Original authorizing provider: Kaylynn Cameron PA-C    Yue Mays would like a refill of the following medications:  FLUoxetine (PROZAC) 10 MG capsule [Kaylynn Cameorn PA-C]    Preferred pharmacy: [facility]/PHARMACY #[id number] - [place], [place] - [address] AT [address] [facility]    Comment:  All is going well on this mad, i am taking 3 capsules a day.

## 2018-04-16 NOTE — PROGRESS NOTES
SUBJECTIVE:                                                    Yue Mays is a 52 year old female who presents to clinic today for the following health issues:    Cyst per pt for a few years now worst the past few weeks per pt noticing increasing in size and discomfort.  Was given doxycycline through her employers NP to help decrease the pain and size but per pt when she finished the med notice the cyst increasing in size and pain again, wondering if she can just have it removed. Patient is upset today that she was not scheduled for a procedure.  We discussed I have to look at a cyst or skin lesion to decide whether or not I will remove it or refer.  She is unhappy about this, I did discuss with her the reasoning behind it.   She has had no drainage or fevers.  Finished doxycyline about 7 days ago.   She was on keflex years ago for the same thing.   Not much pain but somewhat tender to touch.  Has not been doing warm compresses but will start.   Patient is obese.         Problem list and histories reviewed & adjusted, as indicated.  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     Cystocele, midline     Urgency of urination     Female stress incontinence     Cystocele     Papanicolaou smear of cervix with low grade squamous intraepithelial lesion (LGSIL)     Depression with anxiety     Past Surgical History:   Procedure Laterality Date     BIOPSY  1989    had a 2 colposcopies for moderate displaysia     COLONOSCOPY N/A 12/31/2015    Procedure: COLONOSCOPY;  Surgeon: Arpit Tony MD;  [facility]     COLONOSCOPY N/A 12/31/2015    Procedure: COMBINED COLONOSCOPY, SINGLE OR MULTIPLE BIOPSY/POLYPECTOMY BY BIOPSY;  Surgeon: Arpit Tony MD;  [facility]     COLONOSCOPY WITH CO2 INSUFFLATION N/A 12/31/2015    Procedure: COLONOSCOPY WITH CO2  "INSUFFLATION;  Surgeon: Arpit Tony MD;  [facility]     COLPORRHAPHY ANTERIOR  8/21/2012    Procedure: COLPORRHAPHY ANTERIOR;  Cardinal Ligament Repair with Anterior Colporrhaphy and a Midurethral Sling;  Surgeon: Valentine Vera MD;  [facility]     GENITOURINARY SURGERY  8/2013    3 slings put in     SLING TRANSVAGINAL  8/21/2012    Procedure: SLING TRANSVAGINAL;;  Surgeon: Valentine Vear MD;  [facility]     SLING TRANSVAGINAL  11/12/2012    Procedure: SLING TRANSVAGINAL;  Cysto with TVT (transobturator approach);  Surgeon: Valentine Vera MD;  Location[facility]       Social History   Substance Use Topics     Smoking status: Never Smoker     Smokeless tobacco: Never Used     Alcohol use 0.0 oz/week      Comment: minimal - maybe once a month     Family History   Problem Relation Age of Onset     CANCER Mother      hodgkins     Breast Cancer Mother      early 30s      Other Cancer Mother      Hodgkin's Disease, Breast Cancer     Arthritis Father      DIABETES Father      I don't recall if he had diabetes     Hypertension Father      Depression/Anxiety Father      undiagnosed     Depression/Anxiety Sister      undiagnosed     Depression Daughter      Anxiety Disorder Daughter      Depression Daughter      Anxiety Disorder Daughter          Current Outpatient Prescriptions   Medication Sig Dispense Refill     FLUoxetine (PROZAC) 10 MG capsule Take 3 capsules (30 mg) by mouth daily 90 capsule 0     sulfamethoxazole-trimethoprim (BACTRIM DS/SEPTRA DS) 800-160 MG per tablet Take 1 tablet by mouth 2 times daily for 10 days 20 tablet 0     Allergies   Allergen Reactions     Nkda [No Known Drug Allergies]        ROS:  Constitutional, HEENT, cardiovascular, pulmonary, GI, , musculoskeletal, neuro, skin, endocrine and psych systems are negative, except as otherwise noted.    OBJECTIVE:     /79  Pulse 87  Temp 97.7  F (36.5  C) (Oral)  Resp 16  Ht 5' 3\" (1.6 m)  Wt 216 lb (98 kg) "  SpO2 96%  Breastfeeding? No  BMI 38.26 kg/m2  Body mass index is 38.26 kg/(m^2).  EYES: Eyes grossly normal to inspection, PERRL and conjunctivae and sclerae normal  RESP: lungs clear to auscultation - no rales, rhonchi or wheezes  CV: regular rate and rhythm, normal S1 S2, no S3 or S4, no murmur, click or rub, no peripheral edema and peripheral pulses strong  MS: no gross musculoskeletal defects noted, no edema  SKIN: {:inferior aspect of sternum (not part of breast) there is a large approximately quarter sized fluctuant mobile mass underneath the skin.  It feels warm to touch and overlying skin is erythematous. Looks like mild cellulitis also in this area with well defined borders. No central pustule that is draining.   NEURO: Normal strength and tone, mentation intact and speech normal        ASSESSMENT/PLAN:   ASSESSMENT / PLAN:  (L02.91) Abscess  (primary encounter diagnosis)  Comment: =  Plan: sulfamethoxazole-trimethoprim (BACTRIM         DS/SEPTRA DS) 800-160 MG per tablet            offered I and D today, patient declined,  but did discuss she will likely need entire thing excised.  Has had for years off and on. She prefers to wait and use warm compresses and a new antibiotic and has f/u scheduled with surgeon in a week.  To emergency room with fevers or recheck sooner if the lesion or redness worsens.     Kaylynn Cameron PA-C  [facility]

## 2018-04-19 ENCOUNTER — OFFICE VISIT (OUTPATIENT)
Dept: FAMILY MEDICINE | Facility: CLINIC | Age: 53
End: 2018-04-19
Payer: COMMERCIAL

## 2018-04-19 VITALS
OXYGEN SATURATION: 96 % | DIASTOLIC BLOOD PRESSURE: 79 MMHG | WEIGHT: 216 LBS | SYSTOLIC BLOOD PRESSURE: 117 MMHG | RESPIRATION RATE: 16 BRPM | HEART RATE: 87 BPM | HEIGHT: 63 IN | TEMPERATURE: 97.7 F | BODY MASS INDEX: 38.27 KG/M2

## 2018-04-19 DIAGNOSIS — L02.91 ABSCESS: Primary | ICD-10-CM

## 2018-04-19 PROCEDURE — 99213 OFFICE O/P EST LOW 20 MIN: CPT | Performed by: PHYSICIAN ASSISTANT

## 2018-04-19 RX ORDER — SULFAMETHOXAZOLE/TRIMETHOPRIM 800-160 MG
1 TABLET ORAL 2 TIMES DAILY
Qty: 20 TABLET | Refills: 0 | Status: SHIPPED | OUTPATIENT
Start: 2018-04-19 | End: 2018-04-29

## 2018-04-19 ASSESSMENT — ANXIETY QUESTIONNAIRES
6. BECOMING EASILY ANNOYED OR IRRITABLE: NOT AT ALL
5. BEING SO RESTLESS THAT IT IS HARD TO SIT STILL: NOT AT ALL
3. WORRYING TOO MUCH ABOUT DIFFERENT THINGS: NOT AT ALL
1. FEELING NERVOUS, ANXIOUS, OR ON EDGE: NOT AT ALL
GAD7 TOTAL SCORE: 0
7. FEELING AFRAID AS IF SOMETHING AWFUL MIGHT HAPPEN: NOT AT ALL
2. NOT BEING ABLE TO STOP OR CONTROL WORRYING: NOT AT ALL
IF YOU CHECKED OFF ANY PROBLEMS ON THIS QUESTIONNAIRE, HOW DIFFICULT HAVE THESE PROBLEMS MADE IT FOR YOU TO DO YOUR WORK, TAKE CARE OF THINGS AT HOME, OR GET ALONG WITH OTHER PEOPLE: NOT DIFFICULT AT ALL

## 2018-04-19 ASSESSMENT — PATIENT HEALTH QUESTIONNAIRE - PHQ9: 5. POOR APPETITE OR OVEREATING: NOT AT ALL

## 2018-04-19 NOTE — NURSING NOTE
"Chief Complaint   Patient presents with     Derm Problem     Check cyst per pt for a few years now on the breast area.     Health Maintenance     colposcopy and PHQ9       Initial /79  Pulse 87  Temp 97.7  F (36.5  C) (Oral)  Resp 16  Ht 5' 3\" (1.6 m)  Wt 216 lb (98 kg)  SpO2 96%  Breastfeeding? No  BMI 38.26 kg/m2 Estimated body mass index is 38.26 kg/(m^2) as calculated from the following:    Height as of this encounter: 5' 3\" (1.6 m).    Weight as of this encounter: 216 lb (98 kg).  Medication Reconciliation: complete      Michelle Martinez MA    "

## 2018-04-19 NOTE — MR AVS SNAPSHOT
After Visit Summary   4/19/2018    Yue Mays    MRN: [id number]           Patient Information     Date Of Birth          1965        Visit Information        Provider Department      4/19/2018 9:00 AM Kaylynn Cameron PA-C [facility]        Today's Diagnoses     Abscess    -  1       Follow-ups after your visit        Your next 10 appointments already scheduled     Apr 27, 2018  8:30 AM CDT   New Visit with Arpit Tony MD   [facility] ([address] [phone]              Who to contact     If you have questions or need follow up information about today's clinic visit or your schedule please contact [facility] directly at [phone].  Normal or non-critical lab and imaging results will be communicated to you by MyChart, letter or phone within 4 business days after the clinic has received the results. If you do not hear from us within 7 days, please contact the clinic through MyChart or phone. If you have a critical or abnormal lab result, we will notify you by phone as soon as possible.  Submit refill requests through [employer] or call your pharmacy and they will forward the refill request to us. Please allow 3 business days for your refill to be completed.          Additional Information About Your Visit        MyChart Information     [employer] gives you secure access to your electronic health record. If you see a primary care provider, you can also send messages to your care team and make appointments. If you have questions, please call your primary care clinic.  If you do not have a primary care provider, please call [phone] and they will assist you.        Care EveryWhere ID     This is your Care EveryWhere ID. This could be used by other organizations to access your [facility] medical records  [id number]        Your Vitals Were     Pulse Temperature Respirations  "Height Pulse Oximetry Breastfeeding?    87 97.7  F (36.5  C) (Oral) 16 5' 3\" (1.6 m) 96% No    BMI (Body Mass Index)                   38.26 kg/m2            Blood Pressure from Last 3 Encounters:   04/19/18 117/79   11/20/17 130/83   01/24/17 131/78    Weight from Last 3 Encounters:   04/19/18 216 lb (98 kg)   11/20/17 220 lb (99.8 kg)   01/24/17 191 lb (86.6 kg)              Today, you had the following     No orders found for display         Today's Medication Changes          These changes are accurate as of 4/19/18  9:57 AM.  If you have any questions, ask your nurse or doctor.               Start taking these medicines.        Dose/Directions    sulfamethoxazole-trimethoprim 800-160 MG per tablet   Commonly known as:  BACTRIM DS/SEPTRA DS   Used for:  Abscess   Started by:  Kaylynn Cameron PA-C        Dose:  1 tablet   Take 1 tablet by mouth 2 times daily for 10 days   Quantity:  20 tablet   Refills:  0            Where to get your medicines      These medications were sent to [facility]/pharmacy #[id number] - [address],  AT [address] [address]     Phone:  [phone]     sulfamethoxazole-trimethoprim 800-160 MG per tablet                Primary Care Provider Office Phone # Fax #    Kaylynn Cameron PA-C [phone] [address] [facility] [address]        Equal Access to Services     RADHA GRANT AH: Simone douglass Soliya, waaxda luqadaha, qaybta kaalmada adeegyakwan, derek davis. So [facility] [phone].    ATENCIÓN: Si habla español, tiene a barry disposición servicios gratuitos de asistencia lingüística. Llame al [phone].    We comply with applicable federal civil rights laws and [place] laws. We do not discriminate on the basis of race, color, national origin, age, disability, sex, sexual orientation, or gender identity.            Thank you!     Thank you " for choosing [facility]  [facility] your [facility]. Our goal is always to provide you with excellent care. Hearing back from our patients is one way we can continue to improve our services. Please take a few minutes to complete the written survey that you may receive in the mail after your visit with us. Thank you!             Your Updated Medication List - Protect others around you: Learn how to safely use, store and throw away your medicines at www.disposemymeds.org.          This list is accurate as of 4/19/18  9:57 AM.  Always use your most recent med list.                   Brand Name Dispense Instructions for use Diagnosis    FLUoxetine 10 MG capsule    PROzac    90 capsule    Take 3 capsules (30 mg) by mouth daily    Depression with anxiety       sulfamethoxazole-trimethoprim 800-160 MG per tablet    BACTRIM DS/SEPTRA DS    20 tablet    Take 1 tablet by mouth 2 times daily for 10 days    Abscess

## 2018-04-20 ASSESSMENT — PATIENT HEALTH QUESTIONNAIRE - PHQ9: SUM OF ALL RESPONSES TO PHQ QUESTIONS 1-9: 5

## 2018-04-20 ASSESSMENT — ANXIETY QUESTIONNAIRES: GAD7 TOTAL SCORE: 0

## 2018-04-27 ENCOUNTER — OFFICE VISIT (OUTPATIENT)
Dept: SURGERY | Facility: CLINIC | Age: 53
End: 2018-04-27
Payer: COMMERCIAL

## 2018-04-27 VITALS
WEIGHT: 217 LBS | SYSTOLIC BLOOD PRESSURE: 123 MMHG | BODY MASS INDEX: 38.45 KG/M2 | HEIGHT: 63 IN | DIASTOLIC BLOOD PRESSURE: 81 MMHG | HEART RATE: 76 BPM

## 2018-04-27 DIAGNOSIS — L08.9 INFECTED SEBACEOUS CYST: Primary | ICD-10-CM

## 2018-04-27 DIAGNOSIS — L72.3 INFECTED SEBACEOUS CYST: Primary | ICD-10-CM

## 2018-04-27 PROCEDURE — 99243 OFF/OP CNSLTJ NEW/EST LOW 30: CPT | Mod: 25 | Performed by: SURGERY

## 2018-04-27 PROCEDURE — 10060 I&D ABSCESS SIMPLE/SINGLE: CPT | Performed by: SURGERY

## 2018-04-27 RX ORDER — SULFAMETHOXAZOLE AND TRIMETHOPRIM 400; 80 MG/1; MG/1
1 TABLET ORAL 2 TIMES DAILY
Qty: 20 TABLET | Refills: 1 | Status: SHIPPED | OUTPATIENT
Start: 2018-04-27 | End: 2018-06-17

## 2018-04-27 NOTE — PROGRESS NOTES
"Dear Kaylynn Mak  I was asked to see this patient by Kaylynn Cameron for please see below.  I have seen Yue Mays and as you know his chief complaint is cyst on her sternum and has been infected in 2011 and then again 1.5 months ago.  Has been on antibiotics for this and is getting worse.    No chills or fever.     HPI:  Patient is a 52 year old female  with complaints see above  The patient noticed the symptoms about 1.5 months ago.    Patient has family history of boils on his head dad  problems  Antibiotics  makes the episode better.      Review Of Systems  Respiratory: No shortness of breath, dyspnea on exertion, cough, or hemoptysis  Cardiovascular: negative  Gastrointestinal: negative  Endocrine: negative  :  negative  /81  Pulse 76  Ht 1.6 m (5' 3\")  Wt 98.4 kg (217 lb)  BMI 38.44 kg/m2    Past Medical History:   Diagnosis Date     Anxiety      ASCUS with positive high risk HPV cervical 6/16/16    Neg 16/18     Depression      Depressive disorder      H/O colposcopy with cervical biopsy 2/2015    LSIL     History of colposcopy 7/11/16    ECC - negative     LSIL (low grade squamous intraepithelial lesion) on Pap smear 1/2015    + HR HPV     Urinary incontinence        Past Surgical History:   Procedure Laterality Date     BIOPSY  1989    had a 2 colposcopies for moderate displaysia     COLONOSCOPY N/A 12/31/2015    Procedure: COLONOSCOPY;  Surgeon: Arpit Tony MD;  [facility]     COLONOSCOPY N/A 12/31/2015    Procedure: COMBINED COLONOSCOPY, SINGLE OR MULTIPLE BIOPSY/POLYPECTOMY BY BIOPSY;  Surgeon: Arpit Tony MD;  [facility]     COLONOSCOPY WITH CO2 INSUFFLATION N/A 12/31/2015    Procedure: COLONOSCOPY WITH CO2 INSUFFLATION;  Surgeon: Arpit Tony MD;  [facility]     COLPORRHAPHY ANTERIOR  8/21/2012    Procedure: COLPORRHAPHY ANTERIOR;  Cardinal Ligament Repair with Anterior Colporrhaphy and a Midurethral Sling;  " "Surgeon: Valentine Vera MD;  [facility]     GENITOURINARY SURGERY  8/2013    3 slings put in     SLING TRANSVAGINAL  8/21/2012    Procedure: SLING TRANSVAGINAL;;  Surgeon: Valentine Vera MD;  [facility]     SLING TRANSVAGINAL  11/12/2012    Procedure: SLING TRANSVAGINAL;  Cysto with TVT (transobturator approach);  Surgeon: Valentine Vera MD;  [facility]       Social History     Social History     Marital status:      Spouse name: N/A     Number of children: N/A     Years of education: N/A     Occupational History     Not on file.     Social History Main Topics     Smoking status: Never Smoker     Smokeless tobacco: Never Used     Alcohol use 0.0 oz/week      Comment: minimal - maybe once a month     Drug use: No     Sexual activity: Yes     Partners: Male     Birth control/ protection: Condom, Male Surgical     Other Topics Concern     Parent/Sibling W/ Cabg, Mi Or Angioplasty Before 65f 55m? No     Social History Narrative       Current Outpatient Prescriptions   Medication Sig Dispense Refill     sulfamethoxazole-trimethoprim (BACTRIM DS/SEPTRA DS) 800-160 MG per tablet Take 1 tablet by mouth 2 times daily for 10 days 20 tablet 0     FLUoxetine (PROZAC) 10 MG capsule Take 3 capsules (30 mg) by mouth daily 90 capsule 0       10 Point review of systems all others are negative.   Above was reviewed  Physical exam: /81  Pulse 76  Ht 1.6 m (5' 3\")  Wt 98.4 kg (217 lb)  BMI 38.44 kg/m2   Patient able to get up on table without difficulty.   Patient is alert and orientated.   Head eyes, nose and mouth within normal limits.  No supraclavicular or cervical adenopathy palpated.  Thyroid within normal limits.  No carotid bruits auscultated.  Lungs are clear to auscultation  Heart is regular rate and rhythm with no murmur or thrills noted.  No costal vertebral angle tenderness noted.  Abdomen is abdomen is soft without significant tenderness, masses, organomegaly or guarding  bowel " "sounds are positive and no caput medusa noted.    Skin was warm and pink  Normal Affect      Lower extremity edema is not present.    On her sternum is a 4 by 2 cm inflamed cyst with no erythema in the surrounding skin  Think I see the opening.    Assessment: On her sternum is a 4 by 2 cm inflamed cyst with no erythema in the surrounding skin  Think I see the opening.   Plan to do since patient has been on 2 different antibiotics will need to open and drain and option of removing completley and leaving open or just draining and continuing with the antibiotics and come back and excise and close at the same time would look better cosmetically. .    Risks of surgery include damage to nerves, bleeding, infection, damage to  Vessels, recurrence.      Risks explained including bleeding, infection, scar and recurrence.    After sterile prep with betadine the area was infiltrated with local.  An incision was made over the infected area.  Of note the opening to the cyst may be on the inferior edge.    It was opened and the pus and sebaceous contents removed. Pressure was applied for hemostasis  A small amount of skin was excised to allow the site to drain better.   /81  Pulse 76  Ht 1.6 m (5' 3\")  Wt 98.4 kg (217 lb)  BMI 38.44 kg/m2    Procedure  Preop dx:  Infected not resolving with antibiotics mid sternum sebaceous cyst  Post op dx:  Same  Procedure: After sterile prep with betadine the area was infiltrated with local.  An incision was made over the infected area.  Of note the opening to the cyst may be on the inferior edge.    It was opened and the pus and sebaceous contents removed. Pressure was applied for hemostasis  A small amount of skin was excised to allow the site to drain better.   Anesthesia:  2% lidocaine  with epinephrine   Est. blood loss: 8 cc  Patient tolerated procedure well.  Hemostasis obtained with pressure.  Follow up with me in 1 month to allow this to heal and excise and close.   Will " give septra

## 2018-04-27 NOTE — PATIENT INSTRUCTIONS
Patient tolerated procedure well.  Hemostasis obtained with pressure.  Follow up with me in 1 month to allow this to heal and excise and close.   Will give septra    HERNIORRHAPHY DISCHARGE INSTRUCTIONS  DR. WILLIS CROSS    1. You may resume your regular diet when you feel you are ready to. DO NOT drink alcoholic beverages for 24 hours or while you are taking prescription medication.    2. Limit your activities for the first 48 hours. Gradually, increase them as tolerated. You may use stairs. I encourage you to walk as tolerated.     3. You will have some discomfort at the incision sites. This is expected. This should improve over the next 2-3 days. Ice and pain medication will help with this pain. Use prescribed pain medication as instructed.    4. Bruising and mild swelling is normal after surgery. For males it is common to have bruising going into the penis and scrotum. The area below and around the incision(s) will be hard and elevated. This is normal. I call it the healing ridge. This will resolve slowly over the next several months. If you feel the pain is increasing and cannot explain it by increasing activity please call us at [phone].    5. The dressing will often have some blood on it. You may shower 24 hours after surgery. Clean gently over incision site. If clear plastic covering or steri-strip comes off and there is still some bleeding or drainage then cover with gauze or band-aid. If no bleeding, there is no reason to cover site. The abdominal binder may be removed after 24 hours after surgery. You may continue to wear it however for comfort. I suggest  you wear an old t-shirt under the abdominal binder for a more comfortable wear.    6. Avoid Aspirin for the first 72 hours after the procedure. This medication may increase the tendency to bleed.    7. Use the following medications (in addition to your normal meds) as shown:  a. Percocet 5 mg 1-2 every 6 hours as needed for severe pain. This  contains 325 mg of Tylenol (acetaminophen) per tablet.  Please do not take more than 4 grams of Tylenol (acetaminophen) per day. For example, you may take 1 Percocet and 1 Tylenol, or 2 Percocet and no Tylenol, or 2 Tylenol and no Percocet every 6 hours.  b. Tylenol (acetaminophen) 500 mg every 6 hours as needed for mild pain. Do not take more than 1000 mg every 6 hours. (see above).  c. Motrin (ibuprofen) 200-800 mg every 6 hours as needed for mild to moderate pain. Take with food.     8. Notify Dr. Tony's clinic at [phone] if:    Your discomfort is not relieved by your pain medication.    You have signs of infection such as temperature above 100.5 degrees orally, chills, or increasing daily discomfort.    Incision site is becoming more red and/or there is purulent drainage.    You have questions or concerns.    9. Please call [phone] to schedule a follow up appointment in about 2 weeks.    10. When taking narcotics (pain medication more than Tylenol [acetaminophen] and Motrin [ibuprofen]) it is important to keep your stools soft to avoid constipation and pain with straining. This is best done by drinking fluids (non-alcoholic and non-caffeinated) and taking a stool softener (i.e. Metamucil or milk of magnesia). You may be able to use non-narcotics for pain relief especially by the 3rd post- operative day. Tylenol (acetaminophen) 500 mg every 6 hours and/or Motrin (ibuprofen) 200-800 mg every 6 hours. Please do not take more than 4 grams of Tylenol (acetaminophen) per day. Remember your Percocet does have Tylenol (acetaminophen) already in it. Please take Motrin (ibuprofen) with food to help protect the stomach. If you have a history of stomach ulcers or stomach problems, do not take Motrin (ibuprofen).     11. Do not drive or operate heavy machinery for 24 hours after surgery or when taking narcotics. You may resume driving when feel that you can safely avoid an accident and are not taking  narcotics. This is usually 5 to 7 days after surgery. You should not be alone for 24 hours after surgery.    12. Have milk of magnesia available at home so that when you take the pain medications you take 1-2 teaspoons a day,  to help reduce problems with constipation.

## 2018-04-27 NOTE — NURSING NOTE
"Chief Complaint   Patient presents with     Consult       Initial /81  Pulse 76  Ht 1.6 m (5' 3\")  Wt 98.4 kg (217 lb)  BMI 38.44 kg/m2 Estimated body mass index is 38.44 kg/(m^2) as calculated from the following:    Height as of this encounter: 1.6 m (5' 3\").    Weight as of this encounter: 98.4 kg (217 lb).  Medication Reconciliation: complete   Nunu Flores Cma      "

## 2018-04-27 NOTE — MR AVS SNAPSHOT
After Visit Summary   4/27/2018    Yue Mays    MRN: [id number]           Patient Information     Date Of Birth          1965        Visit Information        Provider Department      4/27/2018 8:30 AM Arpit Tony MD [facility]        Today's Diagnoses     Infected sebaceous cyst    -  1      Care Instructions    Patient tolerated procedure well.  Hemostasis obtained with pressure.  Follow up with me in 1 month to allow this to heal and excise and close.   Will give septra    HERNIORRHAPHY DISCHARGE INSTRUCTIONS  DR. ARPIT TONY    1. You may resume your regular diet when you feel you are ready to. DO NOT drink alcoholic beverages for 24 hours or while you are taking prescription medication.    2. Limit your activities for the first 48 hours. Gradually, increase them as tolerated. You may use stairs. I encourage you to walk as tolerated.     3. You will have some discomfort at the incision sites. This is expected. This should improve over the next 2-3 days. Ice and pain medication will help with this pain. Use prescribed pain medication as instructed.    4. Bruising and mild swelling is normal after surgery. For males it is common to have bruising going into the penis and scrotum. The area below and around the incision(s) will be hard and elevated. This is normal. I call it the healing ridge. This will resolve slowly over the next several months. If you feel the pain is increasing and cannot explain it by increasing activity please call us at [phone].    5. The dressing will often have some blood on it. You may shower 24 hours after surgery. Clean gently over incision site. If clear plastic covering or steri-strip comes off and there is still some bleeding or drainage then cover with gauze or band-aid. If no bleeding, there is no reason to cover site. The abdominal binder may be removed after 24 hours after surgery. You may continue to wear it however for  comfort. I suggest  you wear an old t-shirt under the abdominal binder for a more comfortable wear.    6. Avoid Aspirin for the first 72 hours after the procedure. This medication may increase the tendency to bleed.    7. Use the following medications (in addition to your normal meds) as shown:  a. Percocet 5 mg 1-2 every 6 hours as needed for severe pain. This contains 325 mg of Tylenol (acetaminophen) per tablet.  Please do not take more than 4 grams of Tylenol (acetaminophen) per day. For example, you may take 1 Percocet and 1 Tylenol, or 2 Percocet and no Tylenol, or 2 Tylenol and no Percocet every 6 hours.  b. Tylenol (acetaminophen) 500 mg every 6 hours as needed for mild pain. Do not take more than 1000 mg every 6 hours. (see above).  c. Motrin (ibuprofen) 200-800 mg every 6 hours as needed for mild to moderate pain. Take with food.     8. Notify Dr. Tony's clinic at [phone] if:    Your discomfort is not relieved by your pain medication.    You have signs of infection such as temperature above 100.5 degrees orally, chills, or increasing daily discomfort.    Incision site is becoming more red and/or there is purulent drainage.    You have questions or concerns.    9. Please call [phone] to schedule a follow up appointment in about 2 weeks.    10. When taking narcotics (pain medication more than Tylenol [acetaminophen] and Motrin [ibuprofen]) it is important to keep your stools soft to avoid constipation and pain with straining. This is best done by drinking fluids (non-alcoholic and non-caffeinated) and taking a stool softener (i.e. Metamucil or milk of magnesia). You may be able to use non-narcotics for pain relief especially by the 3rd post- operative day. Tylenol (acetaminophen) 500 mg every 6 hours and/or Motrin (ibuprofen) 200-800 mg every 6 hours. Please do not take more than 4 grams of Tylenol (acetaminophen) per day. Remember your Percocet does have Tylenol (acetaminophen) already in  it. Please take Motrin (ibuprofen) with food to help protect the stomach. If you have a history of stomach ulcers or stomach problems, do not take Motrin (ibuprofen).     11. Do not drive or operate heavy machinery for 24 hours after surgery or when taking narcotics. You may resume driving when feel that you can safely avoid an accident and are not taking narcotics. This is usually 5 to 7 days after surgery. You should not be alone for 24 hours after surgery.    12. Have milk of magnesia available at home so that when you take the pain medications you take 1-2 teaspoons a day,  to help reduce problems with constipation.                Follow-ups after your visit        Your next 10 appointments already scheduled     Jun 01, 2018  8:30 AM CDT   PROCEDURE with Arpit Tony MD   [facility]    [address] [place] [address]  [place] [id number] [phone]              Who to contact     If you have questions or need follow up information about today's clinic visit or your schedule please contact [facility] directly at [phone].  Normal or non-critical lab and imaging results will be communicated to you by MyChart, letter or phone within 4 business days after the clinic has received the results. If you do not hear from us within 7 days, please contact the clinic through moblit or phone. If you have a critical or abnormal lab result, we will notify you by phone as soon as possible.  Submit refill requests through [employer] or call your pharmacy and they will forward the refill request to us. Please allow 3 business days for your refill to be completed.          Additional Information About Your Visit        [employer] Mobile Information     [employer] gives you secure access to your electronic health record. If you see a primary care provider, you can also send messages to your care team and make appointments. If you have questions, please call your primary care clinic.  " If you do not have a primary care provider, please call [phone] and they will assist you.        Care EveryWhere ID     This is your Care EveryWhere ID. This could be used by other organizations to access your [place] medical records  [id number]        Your Vitals Were     Pulse Height BMI (Body Mass Index)             76 1.6 m (5' 3\") 38.44 kg/m2          Blood Pressure from Last 3 Encounters:   04/27/18 123/81   04/19/18 117/79   11/20/17 130/83    Weight from Last 3 Encounters:   04/27/18 98.4 kg (217 lb)   04/19/18 98 kg (216 lb)   11/20/17 99.8 kg (220 lb)              We Performed the Following     DRAIN SKIN ABSCESS COMPLICATED/MULTIPLE          Today's Medication Changes          These changes are accurate as of 4/27/18  9:24 AM.  If you have any questions, ask your nurse or doctor.               These medicines have changed or have updated prescriptions.        Dose/Directions    * sulfamethoxazole-trimethoprim 800-160 MG per tablet   Commonly known as:  BACTRIM DS/SEPTRA DS   This may have changed:  Another medication with the same name was added. Make sure you understand how and when to take each.   Used for:  Abscess   Changed by:  Arpit Tony MD        Dose:  1 tablet   Take 1 tablet by mouth 2 times daily for 10 days   Quantity:  20 tablet   Refills:  0       * sulfamethoxazole-trimethoprim 400-80 MG per tablet   Commonly known as:  BACTRIM/SEPTRA   This may have changed:  You were already taking a medication with the same name, and this prescription was added. Make sure you understand how and when to take each.   Used for:  Infected sebaceous cyst   Changed by:  Arpit Tony MD        Dose:  1 tablet   Take 1 tablet by mouth 2 times daily   Quantity:  20 tablet   Refills:  1       * Notice:  This list has 2 medication(s) that are the same as other medications prescribed for you. Read the directions carefully, and ask your doctor or other care provider to review them " with you.         Where to get your medicines      These medications were sent to [facility]/pharmacy #[id number] - [place], [place] - [address],  AT [address]  [address] , [facility] [address]     Phone:  [phone]     sulfamethoxazole-trimethoprim 400-80 MG per tablet                Primary Care Provider Office Phone # Fax #    Kaylynn Cameron PA-C [phone] [address] [address] [facility] [address]        Equal Access to Services     : Hadii aad ku hadasho Soomaali, waaxda luqadaha, qaybta kaalmada adeegyada, derek chaney . So [facility] [phone].    ATENCIÓN: Si habla español, tiene a barry disposición servicios gratuitos de asistencia lingüística. [facility] [phone].    We comply with applicable federal civil rights laws and [place] laws. We do not discriminate on the basis of race, color, national origin, age, disability, sex, sexual orientation, or gender identity.            Thank you!     Thank you for choosing [facility]  for your care. Our goal is always to provide you with excellent care. Hearing back from our patients is one way we can continue to improve our services. Please take a few minutes to complete the written survey that you may receive in the mail after your visit with us. Thank you!             Your Updated Medication List - Protect others around you: Learn how to safely use, store and throw away your medicines at www.disposemymeds.org.          This list is accurate as of 4/27/18  9:24 AM.  Always use your most recent med list.                   Brand Name Dispense Instructions for use Diagnosis    FLUoxetine 10 MG capsule    PROzac    90 capsule    Take 3 capsules (30 mg) by mouth daily    Depression with anxiety       * sulfamethoxazole-trimethoprim 800-160 MG per tablet    BACTRIM DS/SEPTRA DS    20 tablet    Take 1 tablet by mouth 2 times daily for 10 days    Abscess        * sulfamethoxazole-trimethoprim 400-80 MG per tablet    BACTRIM/SEPTRA    20 tablet    Take 1 tablet by mouth 2 times daily    Infected sebaceous cyst       * Notice:  This list has 2 medication(s) that are the same as other medications prescribed for you. Read the directions carefully, and ask your doctor or other care provider to review them with you.

## 2018-04-27 NOTE — LETTER
"    4/27/2018         RE: Yue Mays  [address] [place] [facility] [place] [address]        Dear Colleague,    Thank you for referring your patient, Yue Mays, to the [facility]. Please see a copy of my visit note below.    Dear Kaylynn Mak  I was asked to see this patient by Kaylynn Cameron for please see below.  I have seen Yue Mays and as you know his chief complaint is cyst on her sternum and has been infected in 2011 and then again 1.5 months ago.  Has been on antibiotics for this and is getting worse.    No chills or fever.     HPI:  Patient is a 52 year old female  with complaints see above  The patient noticed the symptoms about 1.5 months ago.    Patient has family history of boils on his head dad  problems  Antibiotics  makes the episode better.      Review Of Systems  Respiratory: No shortness of breath, dyspnea on exertion, cough, or hemoptysis  Cardiovascular: negative  Gastrointestinal: negative  Endocrine: negative  :  negative  /81  Pulse 76  Ht 1.6 m (5' 3\")  Wt 98.4 kg (217 lb)  BMI 38.44 kg/m2    Past Medical History:   Diagnosis Date     Anxiety      ASCUS with positive high risk HPV cervical 6/16/16    Neg 16/18     Depression      Depressive disorder      H/O colposcopy with cervical biopsy 2/2015    LSIL     History of colposcopy 7/11/16    ECC - negative     LSIL (low grade squamous intraepithelial lesion) on Pap smear 1/2015    + HR HPV     Urinary incontinence        Past Surgical History:   Procedure Laterality Date     BIOPSY  1989    had a 2 colposcopies for moderate displaysia     COLONOSCOPY N/A 12/31/2015    Procedure: COLONOSCOPY;  Surgeon: Arpit Tony MD;  Location: [facility] OR     COLONOSCOPY N/A 12/31/2015    Procedure: COMBINED COLONOSCOPY, SINGLE OR MULTIPLE BIOPSY/POLYPECTOMY BY BIOPSY;  Surgeon: Arpit Tony MD;  Location: [facility] OR     COLONOSCOPY WITH CO2 INSUFFLATION N/A 12/31/2015    Procedure: COLONOSCOPY " "WITH CO2 INSUFFLATION;  Surgeon: Arpit Tony MD;  Location: [facility] OR     COLPORRHAPHY ANTERIOR  8/21/2012    Procedure: COLPORRHAPHY ANTERIOR;  Cardinal Ligament Repair with Anterior Colporrhaphy and a Midurethral Sling;  Surgeon: Valentine Vera MD;  Location: [facility] OR     GENITOURINARY SURGERY  8/2013    3 slings put in     SLING TRANSVAGINAL  8/21/2012    Procedure: SLING TRANSVAGINAL;;  Surgeon: Valentine Vera MD;  Location: [facility] OR     SLING TRANSVAGINAL  11/12/2012    Procedure: SLING TRANSVAGINAL;  Cysto with TVT (transobturator approach);  Surgeon: Valentine Vera MD;  Location: [facility] OR       Social History     Social History     Marital status:      Spouse name: N/A     Number of children: N/A     Years of education: N/A     Occupational History     Not on file.     Social History Main Topics     Smoking status: Never Smoker     Smokeless tobacco: Never Used     Alcohol use 0.0 oz/week      Comment: minimal - maybe once a month     Drug use: No     Sexual activity: Yes     Partners: Male     Birth control/ protection: Condom, Male Surgical     Other Topics Concern     Parent/Sibling W/ Cabg, Mi Or Angioplasty Before 65f 55m? No     Social History Narrative       Current Outpatient Prescriptions   Medication Sig Dispense Refill     sulfamethoxazole-trimethoprim (BACTRIM DS/SEPTRA DS) 800-160 MG per tablet Take 1 tablet by mouth 2 times daily for 10 days 20 tablet 0     FLUoxetine (PROZAC) 10 MG capsule Take 3 capsules (30 mg) by mouth daily 90 capsule 0       10 Point review of systems all others are negative.   Above was reviewed  Physical exam: /81  Pulse 76  Ht 1.6 m (5' 3\")  Wt 98.4 kg (217 lb)  BMI 38.44 kg/m2   Patient able to get up on table without difficulty.   Patient is alert and orientated.   Head eyes, nose and mouth within normal limits.  No supraclavicular or cervical adenopathy palpated.  Thyroid within normal limits.  No carotid bruits auscultated.  Lungs are " "clear to auscultation  Heart is regular rate and rhythm with no murmur or thrills noted.  No costal vertebral angle tenderness noted.  Abdomen is abdomen is soft without significant tenderness, masses, organomegaly or guarding  bowel sounds are positive and no caput medusa noted.    Skin was warm and pink  Normal Affect      Lower extremity edema is not present.    On her sternum is a 4 by 2 cm inflamed cyst with no erythema in the surrounding skin  Think I see the opening.    Assessment: On her sternum is a 4 by 2 cm inflamed cyst with no erythema in the surrounding skin  Think I see the opening.   Plan to do since patient has been on 2 different antibiotics will need to open and drain and option of removing completley and leaving open or just draining and continuing with the antibiotics and come back and excise and close at the same time would look better cosmetically. .    Risks of surgery include damage to nerves, bleeding, infection, damage to  Vessels, recurrence.      Risks explained including bleeding, infection, scar and recurrence.    After sterile prep with betadine the area was infiltrated with local.  An incision was made over the infected area.  Of note the opening to the cyst may be on the inferior edge.    It was opened and the pus and sebaceous contents removed. Pressure was applied for hemostasis  A small amount of skin was excised to allow the site to drain better.   /81  Pulse 76  Ht 1.6 m (5' 3\")  Wt 98.4 kg (217 lb)  BMI 38.44 kg/m2    Procedure  Preop dx:  Infected not resolving with antibiotics mid sternum sebaceous cyst  Post op dx:  Same  Procedure: After sterile prep with betadine the area was infiltrated with local.  An incision was made over the infected area.  Of note the opening to the cyst may be on the inferior edge.    It was opened and the pus and sebaceous contents removed. Pressure was applied for hemostasis  A small amount of skin was excised to allow the site to drain " better.   Anesthesia:  2% lidocaine  with epinephrine   Est. blood loss: 8 cc  Patient tolerated procedure well.  Hemostasis obtained with pressure.  Follow up with me in 1 month to allow this to heal and excise and close.   Will give septra      Again, thank you for allowing me to participate in the care of your patient.        Sincerely,        Arpit Tony MD

## 2018-05-21 ENCOUNTER — HEALTH MAINTENANCE LETTER (OUTPATIENT)
Age: 53
End: 2018-05-21

## 2018-05-30 ENCOUNTER — TELEPHONE (OUTPATIENT)
Dept: FAMILY MEDICINE | Facility: CLINIC | Age: 53
End: 2018-05-30

## 2018-05-30 NOTE — TELEPHONE ENCOUNTER
Pt is past due for fu Pap Smear if declining Colposcopy  Reminder letter was sent 5/8/18  LMTC and schedule at [facility]  Left this writers number in case of questions  If no reply and/or appt within two weeks (6/13/18) patient will be considered lost to pap tracking f/u.  Leeanna Donato,   Pap Tracking

## 2018-06-01 ENCOUNTER — OFFICE VISIT (OUTPATIENT)
Dept: SURGERY | Facility: CLINIC | Age: 53
End: 2018-06-01
Payer: COMMERCIAL

## 2018-06-01 VITALS
SYSTOLIC BLOOD PRESSURE: 129 MMHG | HEART RATE: 68 BPM | DIASTOLIC BLOOD PRESSURE: 79 MMHG | OXYGEN SATURATION: 96 % | BODY MASS INDEX: 37.01 KG/M2 | WEIGHT: 216.8 LBS | HEIGHT: 64 IN | TEMPERATURE: 98 F

## 2018-06-01 DIAGNOSIS — L72.3 SEBACEOUS CYST: Primary | ICD-10-CM

## 2018-06-01 PROCEDURE — 11404 EXC TR-EXT B9+MARG 3.1-4 CM: CPT | Mod: 51 | Performed by: SURGERY

## 2018-06-01 PROCEDURE — 88305 TISSUE EXAM BY PATHOLOGIST: CPT | Performed by: SURGERY

## 2018-06-01 PROCEDURE — 12032 INTMD RPR S/A/T/EXT 2.6-7.5: CPT | Performed by: SURGERY

## 2018-06-01 NOTE — LETTER
[facility]           [address] [place] [address]           Tel [phone]  Yue Mays  [address]   [place] [address]      June 5, 2018    Dear Yue,  This letter is to inform you of the results of your pathology report on your cyst we removed.  If you have questions please feel free to call my assistant  At [phone] .    Your pathology report was:  FINAL DIAGNOSIS:   Skin, lower anterior chest:   - Histologic changes suggestive of a ruptured cyst.     So just what we thought.  No cancer.  Just follow up with me to make sure you are doing well and to remove your sutures.     If you do have further questions please don t hesitate to call my assistant at  .  We do not have someone answering the phone all the time at my assistants number so if leave a message may take a day or so to get back to you.  So if more urgent then call the below number.    To make an appointment call [phone]: .   Sincerely,     Arpit Tony M.D.  ___

## 2018-06-01 NOTE — MR AVS SNAPSHOT
After Visit Summary   6/1/2018    Yue Mays    MRN: [id number]           Patient Information     Date Of Birth          1965        Visit Information        Provider Department      6/1/2018 8:30 AM Arpit Tony MD [facility] Instructions    Follow up with me in 2- 3 weeks. For suture removal and path report  HERNIORRHAPHY DISCHARGE INSTRUCTIONS  DR. ARPIT TONY    1. You may resume your regular diet when you feel you are ready to. DO NOT drink alcoholic beverages for 24 hours or while you are taking prescription medication.    2. Limit your activities for the first 48 hours. Gradually, increase them as tolerated. You may use stairs. I encourage you to walk as tolerated.     3. You will have some discomfort at the incision sites. This is expected. This should improve over the next 2-3 days. Ice and pain medication will help with this pain. Use prescribed pain medication as instructed.    4. Bruising and mild swelling is normal after surgery. For males it is common to have bruising going into the penis and scrotum. The area below and around the incision(s) will be hard and elevated. This is normal. I call it the healing ridge. This will resolve slowly over the next several months. If you feel the pain is increasing and cannot explain it by increasing activity please call us at [phone].    5. The dressing will often have some blood on it. You may shower 24 hours after surgery. Clean gently over incision site. If clear plastic covering or steri-strip comes off and there is still some bleeding or drainage then cover with gauze or band-aid. If no bleeding, there is no reason to cover site. The abdominal binder may be removed after 24 hours after surgery. You may continue to wear it however for comfort. I suggest  you wear an old t-shirt under the abdominal binder for a more comfortable wear.    6. Avoid Aspirin for the first 72 hours after the procedure.  This medication may increase the tendency to bleed.    7. Use the following medications (in addition to your normal meds) as shown:  a. Percocet 5 mg 1-2 every 6 hours as needed for severe pain. This contains 325 mg of Tylenol (acetaminophen) per tablet.  Please do not take more than 4 grams of Tylenol (acetaminophen) per day. For example, you may take 1 Percocet and 1 Tylenol, or 2 Percocet and no Tylenol, or 2 Tylenol and no Percocet every 6 hours.  b. Tylenol (acetaminophen) 500 mg every 6 hours as needed for mild pain. Do not take more than 1000 mg every 6 hours. (see above).  c. Motrin (ibuprofen) 200-800 mg every 6 hours as needed for mild to moderate pain. Take with food.     8. Notify Dr. Tony's clinic at [phone] if:    Your discomfort is not relieved by your pain medication.    You have signs of infection such as temperature above 100.5 degrees orally, chills, or increasing daily discomfort.    Incision site is becoming more red and/or there is purulent drainage.    You have questions or concerns.    9. Please call [phone] to schedule a follow up appointment in about 2 weeks.    10. When taking narcotics (pain medication more than Tylenol [acetaminophen] and Motrin [ibuprofen]) it is important to keep your stools soft to avoid constipation and pain with straining. This is best done by drinking fluids (non-alcoholic and non-caffeinated) and taking a stool softener (i.e. Metamucil or milk of magnesia). You may be able to use non-narcotics for pain relief especially by the 3rd post- operative day. Tylenol (acetaminophen) 500 mg every 6 hours and/or Motrin (ibuprofen) 200-800 mg every 6 hours. Please do not take more than 4 grams of Tylenol (acetaminophen) per day. Remember your Percocet does have Tylenol (acetaminophen) already in it. Please take Motrin (ibuprofen) with food to help protect the stomach. If you have a history of stomach ulcers or stomach problems, do not take Motrin  (ibuprofen).     11. Do not drive or operate heavy machinery for 24 hours after surgery or when taking narcotics. You may resume driving when feel that you can safely avoid an accident and are not taking narcotics. This is usually 5 to 7 days after surgery. You should not be alone for 24 hours after surgery.    12. Have milk of magnesia available at home so that when you take the pain medications you take 1-2 teaspoons a day,  to help reduce problems with constipation.                  Follow-ups after your visit        Your next 10 appointments already scheduled     Guerrero 15, 2018  8:30 AM CDT   Return Visit with Arpit Tony MD   [facility]    [address] [place] [address] [phone]              Who to contact     If you have questions or need follow up information about today's clinic visit or your schedule please contact [facility] directly at [phone].  Normal or non-critical lab and imaging results will be communicated to you by [employer], letter or phone within 4 business days after the clinic has received the results. If you do not hear from us within 7 days, please contact the clinic through [employer] or phone. If you have a critical or abnormal lab result, we will notify you by phone as soon as possible.  Submit refill requests through [employer] or call your pharmacy and they will forward the refill request to us. Please allow 3 business days for your refill to be completed.          Additional Information About Your Visit        [employer] Information     [employer] gives you secure access to your electronic health record. If you see a primary care provider, you can also send messages to your care team and make appointments. If you have questions, please call your primary care clinic.  If you do not have a primary care provider, please call [phone] and they will assist you.        Care EveryWhere ID     This is your Care  "EveryWhere ID. This could be used by other organizations to access your [place] medical records  [id number]        Your Vitals Were     Pulse Temperature Height Pulse Oximetry BMI (Body Mass Index)       68 98  F (36.7  C) (Oral) 1.626 m (5' 4\") 96% 37.21 kg/m2        Blood Pressure from Last 3 Encounters:   06/01/18 129/79   04/27/18 123/81   04/19/18 117/79    Weight from Last 3 Encounters:   06/01/18 98.3 kg (216 lb 12.8 oz)   04/27/18 98.4 kg (217 lb)   04/19/18 98 kg (216 lb)              Today, you had the following     No orders found for display       Primary Care Provider Office Phone # Fax #    Kaylynn Cameron PA-C [phone] [address] [facility] [address]        Equal Access to Services     RADHA GRANT : Hadii aad ku hadasho Soebenali, waaxda luqadaha, qaybta kaalmada adeegyada, waxkayleigh chaney . So [facility] [phone].    ATENCIÓN: Si habla español, tiene a barry disposición servicios gratuitos de asistencia lingüística. Wing al [phone].    We comply with applicable federal civil rights laws and [place] laws. We do not discriminate on the basis of race, color, national origin, age, disability, sex, sexual orientation, or gender identity.            Thank you!     Thank you for choosing [facility]  for your care. Our goal is always to provide you with excellent care. Hearing back from our patients is one way we can continue to improve our services. Please take a few minutes to complete the written survey that you may receive in the mail after your visit with us. Thank you!             Your Updated Medication List - Protect others around you: Learn how to safely use, store and throw away your medicines at www.disposemymeds.org.          This list is accurate as of 6/1/18  9:24 AM.  Always use your most recent med list.                   Brand Name Dispense Instructions for use Diagnosis    FLUoxetine 10 MG capsule    PROzac "    90 capsule    Take 3 capsules (30 mg) by mouth daily    Depression with anxiety       sulfamethoxazole-trimethoprim 400-80 MG per tablet    BACTRIM/SEPTRA    20 tablet    Take 1 tablet by mouth 2 times daily    Infected sebaceous cyst

## 2018-06-01 NOTE — PATIENT INSTRUCTIONS
Follow up with me in 2- 3 weeks. For suture removal and path report  HERNIORRHAPHY DISCHARGE INSTRUCTIONS  DR. WILLIS CROSS    1. You may resume your regular diet when you feel you are ready to. DO NOT drink alcoholic beverages for 24 hours or while you are taking prescription medication.    2. Limit your activities for the first 48 hours. Gradually, increase them as tolerated. You may use stairs. I encourage you to walk as tolerated.     3. You will have some discomfort at the incision sites. This is expected. This should improve over the next 2-3 days. Ice and pain medication will help with this pain. Use prescribed pain medication as instructed.    4. Bruising and mild swelling is normal after surgery. For males it is common to have bruising going into the penis and scrotum. The area below and around the incision(s) will be hard and elevated. This is normal. I call it the healing ridge. This will resolve slowly over the next several months. If you feel the pain is increasing and cannot explain it by increasing activity please call us at [phone].    5. The dressing will often have some blood on it. You may shower 24 hours after surgery. Clean gently over incision site. If clear plastic covering or steri-strip comes off and there is still some bleeding or drainage then cover with gauze or band-aid. If no bleeding, there is no reason to cover site. The abdominal binder may be removed after 24 hours after surgery. You may continue to wear it however for comfort. I suggest  you wear an old t-shirt under the abdominal binder for a more comfortable wear.    6. Avoid Aspirin for the first 72 hours after the procedure. This medication may increase the tendency to bleed.    7. Use the following medications (in addition to your normal meds) as shown:  a. Percocet 5 mg 1-2 every 6 hours as needed for severe pain. This contains 325 mg of Tylenol (acetaminophen) per tablet.  Please do not take more than 4 grams of  Tylenol (acetaminophen) per day. For example, you may take 1 Percocet and 1 Tylenol, or 2 Percocet and no Tylenol, or 2 Tylenol and no Percocet every 6 hours.  b. Tylenol (acetaminophen) 500 mg every 6 hours as needed for mild pain. Do not take more than 1000 mg every 6 hours. (see above).  c. Motrin (ibuprofen) 200-800 mg every 6 hours as needed for mild to moderate pain. Take with food.     8. Notify Dr. Tony's clinic at [phone] if:    Your discomfort is not relieved by your pain medication.    You have signs of infection such as temperature above 100.5 degrees orally, chills, or increasing daily discomfort.    Incision site is becoming more red and/or there is purulent drainage.    You have questions or concerns.    9. Please call [phone] to schedule a follow up appointment in about 2 weeks.    10. When taking narcotics (pain medication more than Tylenol [acetaminophen] and Motrin [ibuprofen]) it is important to keep your stools soft to avoid constipation and pain with straining. This is best done by drinking fluids (non-alcoholic and non-caffeinated) and taking a stool softener (i.e. Metamucil or milk of magnesia). You may be able to use non-narcotics for pain relief especially by the 3rd post- operative day. Tylenol (acetaminophen) 500 mg every 6 hours and/or Motrin (ibuprofen) 200-800 mg every 6 hours. Please do not take more than 4 grams of Tylenol (acetaminophen) per day. Remember your Percocet does have Tylenol (acetaminophen) already in it. Please take Motrin (ibuprofen) with food to help protect the stomach. If you have a history of stomach ulcers or stomach problems, do not take Motrin (ibuprofen).     11. Do not drive or operate heavy machinery for 24 hours after surgery or when taking narcotics. You may resume driving when feel that you can safely avoid an accident and are not taking narcotics. This is usually 5 to 7 days after surgery. You should not be alone for 24 hours after  surgery.    12. Have milk of magnesia available at home so that when you take the pain medications you take 1-2 teaspoons a day,  to help reduce problems with constipation.

## 2018-06-01 NOTE — PROGRESS NOTES
"Risks explained including bleeding, infection, scar and recurrence.    Patient has a previously infected sebaceous cyst that was opened and drained now no evidence of infection so will plan to excise nad close   This is on her lower sternum.    After sterile prep with betadine the area was infiltrated with local.  An elliptical incision was made to removed skin with the lesion with small 1-2 mm margin.  After removal the skin was undermined and multiple interrupted 3-0 vicryl sutures was used to bring the skin edges together.  Then the skin was closed with the same suture in a running fashion.  The wound was reinforced with 3-0 nylon interrupted vertical mattress suture time 1  A small bleeder was ligated with the 3-0 vicryl.   /79  Pulse 68  Temp 98  F (36.7  C) (Oral)  Ht 1.626 m (5' 4\")  Wt 98.3 kg (216 lb 12.8 oz)  SpO2 96%  BMI 37.21 kg/m2    Procedure  Preop dx:  3.5 cm cyst on her lower sternum  Post op dx:  Same  Procedure:  4 cm exesion of 3.5 cm cyst with undermining and multiple layer closure of 4 cm wound   After sterile prep with betadine the area was infiltrated with local.  An elliptical incision was made to removed skin with the lesion with small 1-2 mm margin.  After removal the skin was undermined and multiple interrupted 3-0 vicryl sutures was used to bring the skin edges together.  Then the skin was closed with the same suture in a running fashion.  The wound was reinforced with 3-0 nylon interrupted vertical mattress suture time 1  A small bleeder was ligated with the 3-0 vicryl.   Anesthesia:  2% lidocaine  with epinephrine   Est. blood loss: 5 cc  Patient tolerated procedure well.  Hemostasis obtained with pressure.  Follow up with me in 2 weeks. For suture removal and path report     Arpit Tony MD    "

## 2018-06-01 NOTE — LETTER
"    6/1/2018         RE: Yue Mays  [address] [facility] [address]        Dear Colleague,    Thank you for referring your patient, Yue Mays, to the [facility]. Please see a copy of my visit note below.    Risks explained including bleeding, infection, scar and recurrence.    Patient has a previously infected sebaceous cyst that was opened and drained now no evidence of infection so will plan to excise nad close   This is on her lower sternum.    After sterile prep with betadine the area was infiltrated with local.  An elliptical incision was made to removed skin with the lesion with small 1-2 mm margin.  After removal the skin was undermined and multiple interrupted 3-0 vicryl sutures was used to bring the skin edges together.  Then the skin was closed with the same suture in a running fashion.  The wound was reinforced with 3-0 nylon interrupted vertical mattress suture time 1  A small bleeder was ligated with the 3-0 vicryl.   /79  Pulse 68  Temp 98  F (36.7  C) (Oral)  Ht 1.626 m (5' 4\")  Wt 98.3 kg (216 lb 12.8 oz)  SpO2 96%  BMI 37.21 kg/m2    Procedure  Preop dx:  3.5 cm cyst on her lower sternum  Post op dx:  Same  Procedure:  4 cm exesion of 3.5 cm cyst with undermining and multiple layer closure of 4 cm wound   After sterile prep with betadine the area was infiltrated with local.  An elliptical incision was made to removed skin with the lesion with small 1-2 mm margin.  After removal the skin was undermined and multiple interrupted 3-0 vicryl sutures was used to bring the skin edges together.  Then the skin was closed with the same suture in a running fashion.  The wound was reinforced with 3-0 nylon interrupted vertical mattress suture time 1  A small bleeder was ligated with the 3-0 vicryl.   Anesthesia:  2% lidocaine  with epinephrine   Est. blood loss: 5 cc  Patient tolerated procedure well.  Hemostasis obtained with pressure.  Follow up with me in 2 weeks. For " suture removal and path report           Again, thank you for allowing me to participate in the care of your patient.        Sincerely,        Arpit Tony MD

## 2018-06-04 LAB — COPATH REPORT: NORMAL

## 2018-06-15 ENCOUNTER — OFFICE VISIT (OUTPATIENT)
Dept: SURGERY | Facility: CLINIC | Age: 53
End: 2018-06-15
Payer: COMMERCIAL

## 2018-06-15 VITALS
HEIGHT: 64 IN | HEART RATE: 68 BPM | SYSTOLIC BLOOD PRESSURE: 129 MMHG | DIASTOLIC BLOOD PRESSURE: 79 MMHG | BODY MASS INDEX: 36.88 KG/M2 | WEIGHT: 216 LBS

## 2018-06-15 DIAGNOSIS — L72.3 SEBACEOUS CYST: Primary | ICD-10-CM

## 2018-06-15 PROCEDURE — 99024 POSTOP FOLLOW-UP VISIT: CPT | Performed by: SURGERY

## 2018-06-15 NOTE — PATIENT INSTRUCTIONS
Yue is a 53 year old female who is status post removal of cyst with no chills and nofever.      Patient's  Pain is  improving.  Appetite is  improving.    Wound(s)  Is/are   clean dry and intact with no evidence of infection.  Sutures removed.       Path report shows:  Skin, lower anterior chest     FINAL DIAGNOSIS:   Skin, lower anterior chest:   - Histologic changes suggestive of a ruptured cyst.     Plan: follow up as needed.  Use antibiotic ointment on wound at night.

## 2018-06-15 NOTE — LETTER
6/15/2018         RE: Yue Mays  [address] [facility] [address]        Dear Colleague,    Thank you for referring your patient, Yue Mays, to the [facility]. Please see a copy of my visit note below.    Yue is a 53 year old female who is status post removal of cyst with no chills and nofever.      Patient's  Pain is  improving.  Appetite is  improving.    Wound(s)  Is/are  20 clean dry and intact with no evidence of infection.  Sutures removed.      Path report shows:  Skin, lower anterior chest     FINAL DIAGNOSIS:   Skin, lower anterior chest:   - Histologic changes suggestive of a ruptured cyst.     Plan: follow up as needed.  Use antibiotic ointment on wound at night.     Time spent with the patient with greater that 50% of the time in discussion was 20 minutes.  Arpit Tony MD                    Again, thank you for allowing me to participate in the care of your patient.        Sincerely,        Arpit Tony MD

## 2018-06-15 NOTE — MR AVS SNAPSHOT
After Visit Summary   6/15/2018    Yue Mays    MRN: [id number]           Patient Information     Date Of Birth          1965        Visit Information        Provider Department      6/15/2018 8:30 AM Arpit Tony MD [facility]        Care Instructions    Yue is a 53 year old female who is status post removal of cyst with no chills and nofever.      Patient's  Pain is  improving.  Appetite is  improving.    Wound(s)  Is/are   clean dry and intact with no evidence of infection.  Sutures removed.       Path report shows:  Skin, lower anterior chest     FINAL DIAGNOSIS:   Skin, lower anterior chest:   - Histologic changes suggestive of a ruptured cyst.     Plan: follow up as needed.  Use antibiotic ointment on wound at night.           Follow-ups after your visit        Your next 10 appointments already scheduled     Jun 20, 2018  9:20 AM CDT   SHORT with Neva Navarrete MD   [facility]    [address] [facility] [address] [phone]              Who to contact     If you have questions or need follow up information about today's clinic visit or your schedule please contact [facility] directly at [phone].  Normal or non-critical lab and imaging results will be communicated to you by MyChart, letter or phone within 4 business days after the clinic has received the results. If you do not hear from us within 7 days, please contact the clinic through MyChart or phone. If you have a critical or abnormal lab result, we will notify you by phone as soon as possible.  Submit refill requests through [employer] or call your pharmacy and they will forward the refill request to us. Please allow 3 business days for your refill to be completed.          Additional Information About Your Visit        [employer] Information     [employer] gives you secure access to your electronic health record. If you see a primary care  "provider, you can also send messages to your care team and make appointments. If you have questions, please call your primary care clinic.  If you do not have a primary care provider, please call [phone] and they will assist you.        Care EveryWhere ID     This is your Care EveryWhere ID. This could be used by other organizations to access your [place] medical records  [id number]        Your Vitals Were     Pulse Height BMI (Body Mass Index)             68 1.626 m (5' 4\") 37.08 kg/m2          Blood Pressure from Last 3 Encounters:   06/15/18 129/79   06/01/18 129/79   04/27/18 123/81    Weight from Last 3 Encounters:   06/15/18 98 kg (216 lb)   06/01/18 98.3 kg (216 lb 12.8 oz)   04/27/18 98.4 kg (217 lb)              Today, you had the following     No orders found for display       Primary Care Provider Office Phone # Fax #    Kaylynn Cameron PA-C [phone] [address] [facility] [address]        Equal Access to Services     [facility] : Hadii silvia ku gennaroo Soliya, waaxda lumikeadaha, qaybta kaalmada lei, derek chaney . So [facility] [phone].    ATENCIÓN: Si habla español, tiene a barry disposición servicios gratuitos de asistencia lingüística. [facility] [phone].    We comply with applicable federal civil rights laws and [place] laws. We do not discriminate on the basis of race, color, national origin, age, disability, sex, sexual orientation, or gender identity.            Thank you!     Thank you for choosing [facility]  for your care. Our goal is always to provide you with excellent care. Hearing back from our patients is one way we can continue to improve our services. Please take a few minutes to complete the written survey that you may receive in the mail after your visit with us. Thank you!             Your Updated Medication List - Protect others around you: Learn how to safely use, store and throw away your " medicines at www.disposemymeds.org.          This list is accurate as of 6/15/18  8:59 AM.  Always use your most recent med list.                   Brand Name Dispense Instructions for use Diagnosis    FLUoxetine 10 MG capsule    PROzac    90 capsule    Take 3 capsules (30 mg) by mouth daily    Depression with anxiety       sulfamethoxazole-trimethoprim 400-80 MG per tablet    BACTRIM/SEPTRA    20 tablet    Take 1 tablet by mouth 2 times daily    Infected sebaceous cyst

## 2018-06-15 NOTE — PROGRESS NOTES
Yue is a 53 year old female who is status post removal of cyst with no chills and nofever.      Patient's  Pain is  improving.  Appetite is  improving.    Wound(s)  Is/are  20 clean dry and intact with no evidence of infection.  Sutures removed.      Path report shows:  Skin, lower anterior chest     FINAL DIAGNOSIS:   Skin, lower anterior chest:   - Histologic changes suggestive of a ruptured cyst.     Plan: follow up as needed.  Use antibiotic ointment on wound at night.     Time spent with the patient with greater that 50% of the time in discussion was 20 minutes.  Arpit Tony MD

## 2018-06-19 NOTE — PROGRESS NOTES
Yue Mays is a 53 year old female who presents for repeat colposcopy, referred by . Pap smear 6 months ago showed: LSIL and HR HPV. The prior pap showed same.     Past Medical History:   Diagnosis Date     Anxiety      ASCUS with positive high risk HPV cervical 6/16/16    Neg 16/18     Depression      Depressive disorder      H/O colposcopy with cervical biopsy 2/2015    LSIL     History of colposcopy 7/11/16    ECC - negative     LSIL (low grade squamous intraepithelial lesion) on Pap smear 1/2015    + HR HPV     Urinary incontinence      Family History   Problem Relation Age of Onset     Cancer Mother      hodgkins     Breast Cancer Mother      early 30s      Other Cancer Mother      Hodgkin's Disease, Breast Cancer     Arthritis Father      Diabetes Father      I don't recall if he had diabetes     Hypertension Father      Depression/Anxiety Father      undiagnosed     Depression/Anxiety Sister      undiagnosed     Depression Daughter      Anxiety Disorder Daughter      Depression Daughter      Anxiety Disorder Daughter        Previous history of abnormal paps?: Yes see problem list  History of cryotherapy (freezing)?: : No  History of veneral diseases: : No  Do you desire testing for any of these diseases? : No  History of genital warts:  No  Visible warts now?:  No  Previously treated? If so, how?:  No     No LMP recorded.  Type of contraception: abstinence  Age at first sexual intercourse:   Number of sexual partners (lifetime):   History   Smoking Status     Never Smoker   Smokeless Tobacco     Never Used     History of sexual abuse:  No  Allergies as of 06/20/2018 - Toi as Reviewed 06/15/2018   Allergen Reaction Noted     Nkda [no known drug allergies]  08/24/2010        PROCEDURE:  Before the procedure, it was ensured that the patient was educated regarding the nature of her findings to date, the implications of them, and what was to be done. She has been made aware of the role of HPV, the natural  history of infection, ways to minimize her future risk, the effect of HPV on the cervix, and treatment options available should they be indicated. The   pathophysiology of the cervix, including a discussion of squamous vs. endometrial cells, and squamous metaplasia have all been reviewed, using illustrations and sketches. The details of the colposcopic procedure were reviewed, as well as the risks of missed diagnoses, pain, infection and bleeding. All questions were answered before proceeding, and informed consent was therefore obtained.    Bimanual examination: was not done  Unenhanced examination of the cervix was normal without lesions. Please refer to images section for details!  Pap repeated?:  Yes   SCJ seen?:  yes  Endocervical speculum needed?:  No  ECC done?:  Yes   Lugol's solution used?:  No  Satisfactory examination?:  yes    Vaginal vault: normal to cursory inspection yes  Urethra normal?:  yes  Labia normal?:  yes  Perineum normal?:  yes  Rectum normal?:  yes    FINDINGS:  Please see image   Cervix: no visible lesions  Procedure: biopsies taken (not including ECC): 0.    Procedure summary: Patient tolerated procedure well     Assessment: Normal exam without visible pathology  Adrian index:     Plan: Specimens labelled and sent to pathology., Will base further treatment on pathology findings., post biopsy instructions given to patient and call to discuss Pathology results

## 2018-06-20 ENCOUNTER — OFFICE VISIT (OUTPATIENT)
Dept: FAMILY MEDICINE | Facility: CLINIC | Age: 53
End: 2018-06-20
Payer: COMMERCIAL

## 2018-06-20 VITALS
HEART RATE: 53 BPM | RESPIRATION RATE: 16 BRPM | TEMPERATURE: 97.5 F | BODY MASS INDEX: 37.56 KG/M2 | HEIGHT: 64 IN | OXYGEN SATURATION: 100 % | DIASTOLIC BLOOD PRESSURE: 78 MMHG | SYSTOLIC BLOOD PRESSURE: 113 MMHG | WEIGHT: 220 LBS

## 2018-06-20 DIAGNOSIS — R87.612 PAPANICOLAOU SMEAR OF CERVIX WITH LOW GRADE SQUAMOUS INTRAEPITHELIAL LESION (LGSIL): Primary | ICD-10-CM

## 2018-06-20 DIAGNOSIS — B97.7 HIGH RISK HPV INFECTION: ICD-10-CM

## 2018-06-20 PROCEDURE — 88175 CYTOPATH C/V AUTO FLUID REDO: CPT | Performed by: FAMILY MEDICINE

## 2018-06-20 PROCEDURE — 87624 HPV HI-RISK TYP POOLED RSLT: CPT | Performed by: FAMILY MEDICINE

## 2018-06-20 PROCEDURE — 88141 CYTOPATH C/V INTERPRET: CPT | Performed by: FAMILY MEDICINE

## 2018-06-20 PROCEDURE — 88305 TISSUE EXAM BY PATHOLOGIST: CPT | Performed by: FAMILY MEDICINE

## 2018-06-20 PROCEDURE — 57456 ENDOCERV CURETTAGE W/SCOPE: CPT | Performed by: FAMILY MEDICINE

## 2018-06-20 ASSESSMENT — PAIN SCALES - GENERAL: PAINLEVEL: NO PAIN (0)

## 2018-06-20 NOTE — MR AVS SNAPSHOT
"              After Visit Summary   6/20/2018    Yue Mays    MRN: [id number]           Patient Information     Date Of Birth          1965        Visit Information        Provider Department      6/20/2018 10:30 AM [facility]a [facility]        Today's Diagnoses     Papanicolaou smear of cervix with low grade squamous intraepithelial lesion (LGSIL)    -  1    High risk HPV infection           Follow-ups after your visit        Who to contact     If you have questions or need follow up information about today's clinic visit or your schedule please contact [facility] directly at [phone].  Normal or non-critical lab and imaging results will be communicated to you by MyChart, letter or phone within 4 business days after the clinic has received the results. If you do not hear from us within 7 days, please contact the clinic through [employer] or phone. If you have a critical or abnormal lab result, we will notify you by phone as soon as possible.  Submit refill requests through [employer] or call your pharmacy and they will forward the refill request to us. Please allow 3 business days for your refill to be completed.          Additional Information About Your Visit        MyChart Information     [employer] gives you secure access to your electronic health record. If you see a primary care provider, you can also send messages to your care team and make appointments. If you have questions, please call your primary care clinic.  If you do not have a primary care provider, please call [phone] and they will assist you.        Care EveryWhere ID     This is your Care EveryWhere ID. This could be used by other organizations to access your [place] medical records  [id number]        Your Vitals Were     Pulse Temperature Respirations Height Last Period Pulse Oximetry    53 97.5  F (36.4  C) (Oral) 16 5' 4\" (1.626 m) 05/26/2018 100%    BMI (Body Mass Index) "                   37.76 kg/m2            Blood Pressure from Last 3 Encounters:   06/20/18 113/78   06/15/18 129/79   06/01/18 129/79    Weight from Last 3 Encounters:   06/20/18 220 lb (99.8 kg)   06/15/18 216 lb (98 kg)   06/01/18 216 lb 12.8 oz (98.3 kg)              We Performed the Following     COLP CERVIX/UPPER VAGINA W ENDOCERV CURETT     HPV High Risk Types DNA Cervical     Pap imaged thin layer diagnostic with HPV (select HPV order below)     Surgical pathology exam          Today's Medication Changes          These changes are accurate as of 6/20/18 11:19 AM.  If you have any questions, ask your nurse or doctor.               Stop taking these medicines if you haven't already. Please contact your care team if you have questions.     sulfamethoxazole-trimethoprim 400-80 MG per tablet   Commonly known as:  BACTRIM/SEPTRA   Stopped by:  Neva Navarrete MD                    Primary Care Provider Office Phone # Fax #    Kaylynn Cameron PA-C [phone] [address] [facility] [address]        Equal Access to Services     [facility]: Hadii silvia cho hadasho Soliya, waaxda luqadaha, qaybta kaalmada adealycia, derek chaney . [facility] [phone].    ATENCIÓN: Si habla español, tiene a barry disposición servicios gratuitos de asistencia lingüística. Ll[facility] [phone].    We comply with applicable federal civil rights laws and [place] laws. We do not discriminate on the basis of race, color, national origin, age, disability, sex, sexual orientation, or gender identity.            Thank you!     Thank you for choosing [facility]  for your care. Our goal is always to provide you with excellent care. Hearing back from our patients is one way we can continue to improve our services. Please take a few minutes to complete the written survey that you may receive in the mail after your visit with us. Thank you!             Your Updated  Medication List - Protect others around you: Learn how to safely use, store and throw away your medicines at www.disposemymeds.org.          This list is accurate as of 6/20/18 11:19 AM.  Always use your most recent med list.                   Brand Name Dispense Instructions for use Diagnosis    FLUoxetine 10 MG capsule    PROzac    90 capsule    Take 3 capsules (30 mg) by mouth daily    Depression with anxiety

## 2018-06-20 NOTE — LETTER
"                                                                          Affirmation of Informed Consent for Surgery or Invasive Procedure    1.  I, (print patient's name) Yue Mays,  1965,   a.  Agree that I will have (include both the medical term and patient words):           Chief Complaint   Patient presents with     Gyn Exam      b.  At [facility].     c.  The reason for this procedure is (medical condition):  LSIL.   d.  This will be done or supervised by:  Neva Forrester MD.   e.  My doctor may have help from others.  Help could include opening or closing         the wound. Help might also include taking grafts, cutting out tissue,                           implanting devices.  I have been told who will help, if known.     2.  I have talked to my doctor or health care team about:   a.  What the procedure is and what will happen.   b   How it may help me (the benefits).   c.  How it may harm me (the most likely and most serious risks).   d.  The long-term effects the procedure might have.    e.  My other choices for treatment.  The risks and benefits of those choices.    f.   What will likely happen if I say \"no\" to this procedure.    g.  How I might feel right after and how quickly I might be expected to recover.      h.  What medicines will be used to manage pain or sedate me.     3.  I agree that:  (If I do not agree with a statement, I have crossed it out and              initialed next to it.)       a.  I will ask questions.     b.  No one has promised me definite results.    c.  If serious problems are found during the procedure, the treatment may                    change.   d.  If I have \"do not resuscitate\" (DNR) wishes, I have discussed this with my                              doctor and they will be put on hold during the procedure.   e. Students and other appropriate people, approved by the facility, may watch                      the procedure and help with tasks they are " qualified for.                                                    f.  Pictures or videos may be taken. They may be used for medical or                  educational reasons only.       g. Tissues or organs removed from my body as part of the normal course of the                    procedure may be used for research or teaching purposes. They will be                  disposed of with respect.                    h.  If a staff person is exposed to my blood or body fluids, my blood will be drawn                   and tested for HIV and hepatitis.  I understand that by law, the test results will                    go:         -  In my medical record.                         -  To the Employee Occupational Health Service and/or Infection Control                                  at this facility.    -  To the [place] [employer] officials.                 i.  I may have a blood transfusion: I have talked to my doctor or care team about:    -  Why I may need a blood transfusion.     - The risks, benefits, and side effects of transfusion - and the risks of not        Having one.     -  Blood safety and other options for treatment.        Consent for blood transfusion obtained during a hospital admission is valid for the entire hospital stay.  Consent  for blood transfusion obtained in the clinic setting is valid for a year from the signature date.                                4.  I understand that:   a.  I can change my mind.  If I do, I must tell my doctor or team as soon as                           possible.              b.  The team members may change during the procedure.                c.   The team will double-check who I am.  They will ask me what I am having                         done and the site of the site of the procedure.  This is done for my safety.    My questions have been answered.  I agree to the procedure.  I have made my special needs and instructions known.      Yue Mays      6/20/2018 10:22  AM  Patient (or representative)/Relationship to patient             Date  Time        Witness:  I have verified that the signature is that of the patient or patient's representative and that this has been signed before the procedure:    NAME:        6/20/2018  10:22 AM         Date  Time  Person verifying patient's name or patient representative's signature     Provider:  I have discussed the procedure and the information stated above with the patient (or the patient's representative) and answered their questions. The patient or their representative consented to the procedure:      Neva Forrester MD    6/20/2018  10:22 AM  Physician or Provider's Signature(s)   Date  Time       Consent for procedure valid for 30 days after patient signature date     [facility]  AFFIRMATION OF INFORMED CONSENT FOR SURGERY OR INVASIVE PROCEDURE               Original - Medical Records

## 2018-06-23 LAB — COPATH REPORT: NORMAL

## 2018-06-24 NOTE — PROGRESS NOTES
Yue,  I have reviewed the results of the laboratory tests that were ordered by the provider that you recently saw. Unfortunately, that provider is out of the clinic for a period of time. You will receive a My Chart message or a phone call regarding the results and what that provider recommends when they return.  Sincerely,   Satnam Barajas

## 2018-06-25 LAB
COPATH REPORT: ABNORMAL
PAP: ABNORMAL

## 2018-06-26 LAB
FINAL DIAGNOSIS: ABNORMAL
HPV HR 12 DNA CVX QL NAA+PROBE: POSITIVE
HPV16 DNA SPEC QL NAA+PROBE: NEGATIVE
HPV18 DNA SPEC QL NAA+PROBE: NEGATIVE
SPECIMEN DESCRIPTION: ABNORMAL
SPECIMEN SOURCE CVX/VAG CYTO: ABNORMAL

## 2019-06-26 ENCOUNTER — TELEPHONE (OUTPATIENT)
Dept: FAMILY MEDICINE | Facility: CLINIC | Age: 54
End: 2019-06-26

## 2019-06-26 DIAGNOSIS — R87.612 PAPANICOLAOU SMEAR OF CERVIX WITH LOW GRADE SQUAMOUS INTRAEPITHELIAL LESION (LGSIL): ICD-10-CM

## 2019-06-26 NOTE — TELEPHONE ENCOUNTER
Pt is past due for Pap follow up  Reminder letter has been sent  LMTC her clinic with any questions or to schedule    Leeanna Donato,   Pap Tracking

## 2019-08-20 NOTE — PROGRESS NOTES
SUBJECTIVE:   CC: Yue Mays is an 54 year old woman who presents for preventive health visit.     Healthy Habits:    MAMMO is due, pt will kaye appt.    Answers for HPI/ROS submitted by the patient on 8/29/2019   Annual Exam:  Frequency of exercise:: None  Getting at least 3 servings of Calcium per day:: NO  Diet:: Regular (no restrictions)  Taking medications regularly:: Yes  Medication side effects:: Not applicable  Bi-annual eye exam:: Yes  Dental care twice a year:: NO  Sleep apnea or symptoms of sleep apnea:: None  abdominal pain: No  Blood in stool: No  Blood in urine: No  chest pain: No  chills: No  congestion: No  constipation: No  cough: Yes  diarrhea: No  dizziness: No  ear pain: No  eye pain: No  nervous/anxious: No  fever: No  frequency: No  genital sores: No  headaches: No  hearing loss: No  heartburn: No  arthralgias: Yes  joint swelling: No  peripheral edema: No  mood changes: No  myalgias: No  nausea: No  dysuria: No  palpitations: No  Skin sensation changes: No  sore throat: No  urgency: No  rash: No  shortness of breath: No  visual disturbance: No  weakness: No  pelvic pain: No  vaginal bleeding: No  vaginal discharge: No  tenderness: No  breast mass: No  breast discharge: No  Additional concerns today:: Yes    MULTIPLE ISSUES:     1)  Discuss coughing issue per pt on and off for a while not going away and wants to have it looked at.  Cough is worse the past spring and summer. Was told allergies previously.   Was seen at her work clinic.   Inhaler (albuterol) helps when she does use.  Has not had a chest xray. Has tried antihistamines, not sure if helps. No nasal drainage or itchy eyes.    Not sure if she had heartburn or not. Doesn't notice any symptoms after eating.   Nonproductive.   Daily cough.   No fevers or weight loss or night sweats.     2)  Depression---not well controlled. Has failed several medications. Will refer to psych. Denies suicidal or homicidal thoughts.  Patient instructed  to go to the emergency room or call 911 if these occur.  Is on prozac currently. phq9 is 8 today. Anxiety score is good.     Due for pap. H/o abnormal's.     Not fasting.     3)  Also wondering about taking birth control to help manage premenopausal symptoms. Periods are irregular.   Has the birth control patch at home. She will start with this. To      Also asks for std screening today.         Today's PHQ-2 Score:   PHQ-2 ( 1999 [employer]) 8/29/2019 4/19/2018   Q1: Little interest or pleasure in doing things 1 1   Q2: Feeling down, depressed or hopeless 0 1   PHQ-2 Score 1 2   Q1: Little interest or pleasure in doing things Several days -   Q2: Feeling down, depressed or hopeless Not at all -   PHQ-2 Score 1 -       Abuse: Current or Past(Physical, Sexual or Emotional)- No  Do you feel safe in your environment? Yes    Social History     Tobacco Use     Smoking status: Never Smoker     Smokeless tobacco: Never Used   Substance Use Topics     Alcohol use: Yes     Alcohol/week: 0.0 oz     Comment: minimal - maybe once a month     If you drink alcohol do you typically have >3 drinks per day or >7 drinks per week? No                     Reviewed orders with patient.  Reviewed health maintenance and updated orders accordingly - Yes  Lab work is in process  Labs reviewed in EPIC  BP Readings from Last 3 Encounters:   08/29/19 128/81   06/20/18 113/78   06/15/18 129/79    Wt Readings from Last 3 Encounters:   08/29/19 101.2 kg (223 lb)   06/20/18 99.8 kg (220 lb)   06/15/18 98 kg (216 lb)                  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     Cystocele, midline     Urgency of urination     Female stress incontinence     Cystocele     Papanicolaou smear of cervix with low grade squamous intraepithelial lesion (LGSIL)     Depression with anxiety     Infected sebaceous cyst     Past Surgical History:    Procedure Laterality Date     BIOPSY  1989    had a 2 colposcopies for moderate displaysia     COLONOSCOPY N/A 12/31/2015    Procedure: COLONOSCOPY;  Surgeon: Arpit Tony MD;  Location: [facility]     COLONOSCOPY N/A 12/31/2015    Procedure: COMBINED COLONOSCOPY, SINGLE OR MULTIPLE BIOPSY/POLYPECTOMY BY BIOPSY;  Surgeon: Arpit Tony MD;  Location: [facility]     COLONOSCOPY WITH CO2 INSUFFLATION N/A 12/31/2015    Procedure: COLONOSCOPY WITH CO2 INSUFFLATION;  Surgeon: Arpit Tony MD;  [facility]     COLPORRHAPHY ANTERIOR  8/21/2012    Procedure: COLPORRHAPHY ANTERIOR;  Cardinal Ligament Repair with Anterior Colporrhaphy and a Midurethral Sling;  Surgeon: Valentine Vera MD;  Location: [facility]     GENITOURINARY SURGERY  8/2013    3 slings put in     SLING TRANSVAGINAL  8/21/2012    Procedure: SLING TRANSVAGINAL;;  Surgeon: Valentine Vera MD;  [facility]     SLING TRANSVAGINAL  11/12/2012    Procedure: SLING TRANSVAGINAL;  Cysto with TVT (transobturator approach);  Surgeon: Valentine Vera MD;  Location: [facility]       Social History     Tobacco Use     Smoking status: Never Smoker     Smokeless tobacco: Never Used   Substance Use Topics     Alcohol use: Yes     Alcohol/week: 0.0 oz     Comment: minimal - maybe once a month     Family History   Problem Relation Age of Onset     Cancer Mother         hodgkins     Breast Cancer Mother         early 30s      Other Cancer Mother         Hodgkin's Disease, Breast Cancer     Arthritis Father      Diabetes Father         I don't recall if he had diabetes     Hypertension Father      Depression/Anxiety Father         undiagnosed     Depression/Anxiety Sister         undiagnosed     Depression Daughter      Anxiety Disorder Daughter      Depression Daughter      Anxiety Disorder Daughter          Current Outpatient Medications   Medication Sig Dispense Refill     FLUoxetine (PROZAC) 10 MG capsule Take 3 capsules (30 mg) by mouth daily  90 capsule 0     Allergies   Allergen Reactions     Nkda [No Known Drug Allergies]        Mammogram Screening: Patient over age 50, mutual decision to screen reflected in health maintenance.    Pertinent mammograms are reviewed under the imaging tab.  History of abnormal Pap smear: YES - updated in Problem List and Health Maintenance accordingly  PAP / HPV Latest Ref Rng & Units 6/20/2018 11/20/2017 6/16/2016   PAP - LSIL(A) LSIL(A) ASC-US(A)   HPV 16 DNA NEG:Negative Negative Negative Negative   HPV 18 DNA NEG:Negative Negative Negative Negative   OTHER HR HPV NEG:Negative Positive(A) Positive(A) Positive(A)     Reviewed and updated as needed this visit by clinical staff  Tobacco  Allergies  Meds  Med Hx  Surg Hx  Fam Hx  Soc Hx        Reviewed and updated as needed this visit by Provider        Past Medical History:   Diagnosis Date     Anxiety      ASCUS with positive high risk HPV cervical 6/16/16    Neg 16/18     Depression      Depressive disorder      H/O colposcopy with cervical biopsy 2/2015    LSIL     History of colposcopy 7/11/16    ECC - negative     LSIL (low grade squamous intraepithelial lesion) on Pap smear 1/2015    + HR HPV     Urinary incontinence       Past Surgical History:   Procedure Laterality Date     BIOPSY  1989    had a 2 colposcopies for moderate displaysia     COLONOSCOPY N/A 12/31/2015    Procedure: COLONOSCOPY;  Surgeon: Arpit Tony MD;  [facility]     COLONOSCOPY N/A 12/31/2015    Procedure: COMBINED COLONOSCOPY, SINGLE OR MULTIPLE BIOPSY/POLYPECTOMY BY BIOPSY;  Surgeon: Arpit Tony MD;  [facility]     COLONOSCOPY WITH CO2 INSUFFLATION N/A 12/31/2015    Procedure: COLONOSCOPY WITH CO2 INSUFFLATION;  Surgeon: Arpit Tony MD;  [facility]     COLPORRHAPHY ANTERIOR  8/21/2012    Procedure: COLPORRHAPHY ANTERIOR;  Cardinal Ligament Repair with Anterior Colporrhaphy and a Midurethral Sling;  Surgeon: Valentine Vera MD;   "[facility]     GENITOURINARY SURGERY  2013    3 slings put in     SLING TRANSVAGINAL  2012    Procedure: SLING TRANSVAGINAL;;  Surgeon: Valentine Vera MD;  [facility]     SLING TRANSVAGINAL  2012    Procedure: SLING TRANSVAGINAL;  Cysto with TVT (transobturator approach);  Surgeon: Valentine Vera MD;  Location[facility]     OB History    Para Term  AB Living   4 2 0 0 0 0   SAB TAB Ectopic Multiple Live Births   0 0 0 0 0      # Outcome Date GA Lbr Ethan/2nd Weight Sex Delivery Anes PTL Lv   4             3             2 Para            1 Para               Obstetric Comments   2 miscarriages       ROS:  CONSTITUTIONAL: NEGATIVE for fever, chills, change in weight  INTEGUMENTARU/SKIN: NEGATIVE for worrisome rashes, moles or lesions  EYES: NEGATIVE for vision changes or irritation  ENT: NEGATIVE for ear, mouth and throat problems  RESP: NEGATIVE for significant cough or SOB  BREAST: NEGATIVE for masses, tenderness or discharge  CV: NEGATIVE for chest pain, palpitations or peripheral edema  GI: NEGATIVE for nausea, abdominal pain, heartburn, or change in bowel habits  MUSCULOSKELETAL: NEGATIVE for significant arthralgias or myalgia  NEURO: NEGATIVE for weakness, dizziness or paresthesias      OBJECTIVE:   /81   Pulse 75   Temp 98.2  F (36.8  C) (Oral)   Resp 16   Ht 1.6 m (5' 3\")   Wt 101.2 kg (223 lb)   SpO2 95%   Breastfeeding? No   BMI 39.50 kg/m    EXAM:  GENERAL: alert, no distress and obese  EYES: Eyes grossly normal to inspection, PERRL and conjunctivae and sclerae normal  HENT: ear canals and TM's normal, nose and mouth without ulcers or lesions  NECK: no adenopathy, no asymmetry, masses, or scars and thyroid normal to palpation  RESP: lungs clear to auscultation - no rales, rhonchi or wheezes  BREAST: normal without masses, tenderness or nipple discharge and no palpable axillary masses or adenopathy  CV: regular rate and rhythm, normal S1 S2, " no S3 or S4, no murmur, click or rub, no peripheral edema and peripheral pulses strong  ABDOMEN: soft, nontender, no hepatosplenomegaly, no masses and bowel sounds normal   (female): normal female external genitalia, normal urethral meatus, vaginal mucosa pink, moist, well rugated, and normal cervix/adnexa/uterus without masses or discharge  MS: no gross musculoskeletal defects noted, no edema  SKIN: no suspicious lesions or rashes  NEURO: Normal strength and tone, mentation intact and speech normal  PSYCH: mentation appears normal, affect normal/bright    Diagnostic Test Results:  Labs reviewed in Epic    ASSESSMENT/PLAN:   1. Routine general medical examination at a health care facility    - Pap imaged thin layer screen with HPV - recommended age 30 - 65 years (select HPV order below)  - HPV High Risk Types DNA Cervical  - Comprehensive metabolic panel; Future  - Lipid panel reflex to direct LDL Fasting; Future    2. Depression with anxiety  Not to goal, has  Tried and failed several medications, needs psych  Continue on prozac for now  Denies suicidal or homicidal thoughts.  Patient instructed to go to the emergency room or call 911 if these occur.    - MENTAL HEALTH REFERRAL  - Adult; Psychiatry and Medication Management; Psychiatry; Other: [facility] [phone]; We will contact you to schedule the appointment or please call with any questions    3. Cough  Could be asthma, allergies, infectious, cancer, GERD, or other. If cxr negative will refer to asthma/allergy for asthma testing and allergy patch testing if indicated/appropriate.   She will continue albuterol that she has prn.   Consider ENT referral for larygnoscopy in the future if needed    - XR Chest 2 Views; Future    4. Screening for diabetes mellitus    - Hemoglobin A1c; Future    COUNSELING:   Reviewed preventive health counseling, as reflected in patient instructions       Regular exercise    Estimated body mass index is  "39.5 kg/m  as calculated from the following:    Height as of this encounter: 1.6 m (5' 3\").    Weight as of this encounter: 101.2 kg (223 lb).    Weight management plan: Discussed healthy diet and exercise guidelines     reports that she has never smoked. She has never used smokeless tobacco.      Counseling Resources:  ATP IV Guidelines  Pooled Cohorts Equation Calculator  Breast Cancer Risk Calculator  FRAX Risk Assessment  ICSI Preventive Guidelines  Dietary Guidelines for Americans, 2010  USDA's MyPlate  ASA Prophylaxis  Lung CA Screening    Kaylynn Cameron PA-C  [facility]  "

## 2019-08-20 NOTE — PATIENT INSTRUCTIONS
Please return fasting for cholesterol and diabetes screening, you can make a lab only appointment for this.  No food or drink other than water for 10 hours.    I will follow up with xray and plan from there for cough    OK TO start birth control patch, consider oral contraceptives if this is not helpful in 2 months    Lifestyle recommendations:  Being overweight or obese puts you are risk of major health problems including but not limited to: heart disease/heart attack, stroke, high cholesterol, high blood pressure, and diabetes.  This is why it is important to be at a healthy weight for your height.     Exercise 30 minutes 3-5 times a week, if you can only do 10 minutes 3 times a week that is still shown to have great benefit!  Brisk walking even counts for this.  Consider free youtube videos for exercise that fits your needs and lifestyle.     Monitor your caffeine and soda intake, try to minimize these beverages    Drink plenty of water (about 70-80 ounces a day)    Try to eat a vegetable and fruit  with lunch and dinner.  Have a breakfast that contains protein such as eggs or oatmeal.  Decrease your white bread, pasta, and sweets intake.  Increase lean proteins like chicken or pork. Try to eat out 1-2 times a week or less.  Monitor your portion sizes, try using smaller plates if needed.  Eat slowly, this gives you time to be aware that your body is full.   Let me know at any time if you would like a referral to a nutritionist!            Preventive Health Recommendations  Female Ages 50 - 64    Yearly exam: See your health care provider every year in order to  o Review health changes.   o Discuss preventive care.    o Review your medicines if your doctor has prescribed any.      Get a Pap test every three years (unless you have an abnormal result and your provider advises testing more often).    If you get Pap tests with HPV test, you only need to test every 5 years, unless you have an abnormal result.     You  do not need a Pap test if your uterus was removed (hysterectomy) and you have not had cancer.    You should be tested each year for STDs (sexually transmitted diseases) if you're at risk.     Have a mammogram every 1 to 2 years.    Have a colonoscopy at age 50, or have a yearly FIT test (stool test). These exams screen for colon cancer.      Have a cholesterol test every 5 years, or more often if advised.    Have a diabetes test (fasting glucose) every three years. If you are at risk for diabetes, you should have this test more often.     If you are at risk for osteoporosis (brittle bone disease), think about having a bone density scan (DEXA).    Shots: Get a flu shot each year. Get a tetanus shot every 10 years.    Nutrition:     Eat at least 5 servings of fruits and vegetables each day.    Eat whole-grain bread, whole-wheat pasta and brown rice instead of white grains and rice.    Get adequate Calcium and Vitamin D.     Lifestyle    Exercise at least 150 minutes a week (30 minutes a day, 5 days a week). This will help you control your weight and prevent disease.    Limit alcohol to one drink per day.    No smoking.     Wear sunscreen to prevent skin cancer.     See your dentist every six months for an exam and cleaning.    See your eye doctor every 1 to 2 years.

## 2019-08-29 ENCOUNTER — ANCILLARY PROCEDURE (OUTPATIENT)
Dept: GENERAL RADIOLOGY | Facility: CLINIC | Age: 54
End: 2019-08-29
Attending: PHYSICIAN ASSISTANT
Payer: COMMERCIAL

## 2019-08-29 ENCOUNTER — RESULT FOLLOW UP (OUTPATIENT)
Dept: FAMILY MEDICINE | Facility: CLINIC | Age: 54
End: 2019-08-29

## 2019-08-29 ENCOUNTER — OFFICE VISIT (OUTPATIENT)
Dept: FAMILY MEDICINE | Facility: CLINIC | Age: 54
End: 2019-08-29
Payer: COMMERCIAL

## 2019-08-29 VITALS
OXYGEN SATURATION: 95 % | BODY MASS INDEX: 39.51 KG/M2 | DIASTOLIC BLOOD PRESSURE: 81 MMHG | TEMPERATURE: 98.2 F | HEART RATE: 75 BPM | WEIGHT: 223 LBS | HEIGHT: 63 IN | RESPIRATION RATE: 16 BRPM | SYSTOLIC BLOOD PRESSURE: 128 MMHG

## 2019-08-29 DIAGNOSIS — R05.9 COUGH: ICD-10-CM

## 2019-08-29 DIAGNOSIS — E66.09 CLASS 2 OBESITY DUE TO EXCESS CALORIES WITHOUT SERIOUS COMORBIDITY WITH BODY MASS INDEX (BMI) OF 39.0 TO 39.9 IN ADULT: ICD-10-CM

## 2019-08-29 DIAGNOSIS — R87.612 PAPANICOLAOU SMEAR OF CERVIX WITH LOW GRADE SQUAMOUS INTRAEPITHELIAL LESION (LGSIL): ICD-10-CM

## 2019-08-29 DIAGNOSIS — Z11.3 SCREEN FOR STD (SEXUALLY TRANSMITTED DISEASE): ICD-10-CM

## 2019-08-29 DIAGNOSIS — E66.812 CLASS 2 OBESITY DUE TO EXCESS CALORIES WITHOUT SERIOUS COMORBIDITY WITH BODY MASS INDEX (BMI) OF 39.0 TO 39.9 IN ADULT: ICD-10-CM

## 2019-08-29 DIAGNOSIS — F41.8 DEPRESSION WITH ANXIETY: ICD-10-CM

## 2019-08-29 DIAGNOSIS — Z13.1 SCREENING FOR DIABETES MELLITUS: ICD-10-CM

## 2019-08-29 DIAGNOSIS — Z00.01 ENCOUNTER FOR ROUTINE ADULT MEDICAL EXAM WITH ABNORMAL FINDINGS: Primary | ICD-10-CM

## 2019-08-29 PROCEDURE — 99396 PREV VISIT EST AGE 40-64: CPT | Performed by: PHYSICIAN ASSISTANT

## 2019-08-29 PROCEDURE — G0145 SCR C/V CYTO,THINLAYER,RESCR: HCPCS | Performed by: PHYSICIAN ASSISTANT

## 2019-08-29 PROCEDURE — 99214 OFFICE O/P EST MOD 30 MIN: CPT | Mod: 25 | Performed by: PHYSICIAN ASSISTANT

## 2019-08-29 PROCEDURE — 87624 HPV HI-RISK TYP POOLED RSLT: CPT | Performed by: PHYSICIAN ASSISTANT

## 2019-08-29 PROCEDURE — G0124 SCREEN C/V THIN LAYER BY MD: HCPCS | Performed by: PHYSICIAN ASSISTANT

## 2019-08-29 PROCEDURE — 71046 X-RAY EXAM CHEST 2 VIEWS: CPT

## 2019-08-29 ASSESSMENT — ANXIETY QUESTIONNAIRES
5. BEING SO RESTLESS THAT IT IS HARD TO SIT STILL: NOT AT ALL
3. WORRYING TOO MUCH ABOUT DIFFERENT THINGS: NOT AT ALL
GAD7 TOTAL SCORE: 0
IF YOU CHECKED OFF ANY PROBLEMS ON THIS QUESTIONNAIRE, HOW DIFFICULT HAVE THESE PROBLEMS MADE IT FOR YOU TO DO YOUR WORK, TAKE CARE OF THINGS AT HOME, OR GET ALONG WITH OTHER PEOPLE: NOT DIFFICULT AT ALL
6. BECOMING EASILY ANNOYED OR IRRITABLE: NOT AT ALL
1. FEELING NERVOUS, ANXIOUS, OR ON EDGE: NOT AT ALL
7. FEELING AFRAID AS IF SOMETHING AWFUL MIGHT HAPPEN: NOT AT ALL
2. NOT BEING ABLE TO STOP OR CONTROL WORRYING: NOT AT ALL

## 2019-08-29 ASSESSMENT — ENCOUNTER SYMPTOMS
SHORTNESS OF BREATH: 0
ABDOMINAL PAIN: 0
MYALGIAS: 0
FREQUENCY: 0
HEMATOCHEZIA: 0
HEARTBURN: 0
EYE PAIN: 0
JOINT SWELLING: 0
PARESTHESIAS: 0
ARTHRALGIAS: 1
CHILLS: 0
COUGH: 1
CONSTIPATION: 0
WEAKNESS: 0
DYSURIA: 0
PALPITATIONS: 0
DIARRHEA: 0
NERVOUS/ANXIOUS: 0
SORE THROAT: 0
NAUSEA: 0
BREAST MASS: 0
DIZZINESS: 0
HEMATURIA: 0
HEADACHES: 0
FEVER: 0

## 2019-08-29 ASSESSMENT — PATIENT HEALTH QUESTIONNAIRE - PHQ9
5. POOR APPETITE OR OVEREATING: NOT AT ALL
SUM OF ALL RESPONSES TO PHQ QUESTIONS 1-9: 8

## 2019-08-29 ASSESSMENT — MIFFLIN-ST. JEOR: SCORE: 1580.65

## 2019-08-30 DIAGNOSIS — Z13.1 SCREENING FOR DIABETES MELLITUS: ICD-10-CM

## 2019-08-30 DIAGNOSIS — Z11.3 SCREEN FOR STD (SEXUALLY TRANSMITTED DISEASE): ICD-10-CM

## 2019-08-30 DIAGNOSIS — R05.9 COUGH: Primary | ICD-10-CM

## 2019-08-30 LAB
ALBUMIN SERPL-MCNC: 3.3 G/DL (ref 3.4–5)
ALP SERPL-CCNC: 89 U/L (ref 40–150)
ALT SERPL W P-5'-P-CCNC: 17 U/L (ref 0–50)
ANION GAP SERPL CALCULATED.3IONS-SCNC: 6 MMOL/L (ref 3–14)
AST SERPL W P-5'-P-CCNC: 14 U/L (ref 0–45)
BILIRUB SERPL-MCNC: 0.3 MG/DL (ref 0.2–1.3)
BUN SERPL-MCNC: 12 MG/DL (ref 7–30)
CALCIUM SERPL-MCNC: 8.8 MG/DL (ref 8.5–10.1)
CHLORIDE SERPL-SCNC: 105 MMOL/L (ref 94–109)
CHOLEST SERPL-MCNC: 186 MG/DL
CO2 SERPL-SCNC: 26 MMOL/L (ref 20–32)
CREAT SERPL-MCNC: 0.67 MG/DL (ref 0.52–1.04)
GFR SERPL CREATININE-BSD FRML MDRD: >90 ML/MIN/{1.73_M2}
GLUCOSE SERPL-MCNC: 80 MG/DL (ref 70–99)
HBA1C MFR BLD: 5 % (ref 0–5.6)
HBV SURFACE AG SERPL QL IA: NONREACTIVE
HCV AB SERPL QL IA: NONREACTIVE
HDLC SERPL-MCNC: 57 MG/DL
HIV 1+2 AB+HIV1 P24 AG SERPL QL IA: NONREACTIVE
LDLC SERPL CALC-MCNC: 89 MG/DL
NONHDLC SERPL-MCNC: 129 MG/DL
POTASSIUM SERPL-SCNC: 4.2 MMOL/L (ref 3.4–5.3)
PROT SERPL-MCNC: 7.2 G/DL (ref 6.8–8.8)
SODIUM SERPL-SCNC: 137 MMOL/L (ref 133–144)
TRIGL SERPL-MCNC: 201 MG/DL

## 2019-08-30 PROCEDURE — 87340 HEPATITIS B SURFACE AG IA: CPT | Performed by: PHYSICIAN ASSISTANT

## 2019-08-30 PROCEDURE — 87389 HIV-1 AG W/HIV-1&-2 AB AG IA: CPT | Performed by: PHYSICIAN ASSISTANT

## 2019-08-30 PROCEDURE — 80053 COMPREHEN METABOLIC PANEL: CPT | Performed by: PHYSICIAN ASSISTANT

## 2019-08-30 PROCEDURE — 80061 LIPID PANEL: CPT | Performed by: PHYSICIAN ASSISTANT

## 2019-08-30 PROCEDURE — 36415 COLL VENOUS BLD VENIPUNCTURE: CPT | Performed by: PHYSICIAN ASSISTANT

## 2019-08-30 PROCEDURE — 86803 HEPATITIS C AB TEST: CPT | Performed by: PHYSICIAN ASSISTANT

## 2019-08-30 PROCEDURE — 87491 CHLMYD TRACH DNA AMP PROBE: CPT | Performed by: PHYSICIAN ASSISTANT

## 2019-08-30 PROCEDURE — 87591 N.GONORRHOEAE DNA AMP PROB: CPT | Performed by: PHYSICIAN ASSISTANT

## 2019-08-30 PROCEDURE — 83036 HEMOGLOBIN GLYCOSYLATED A1C: CPT | Performed by: PHYSICIAN ASSISTANT

## 2019-08-30 PROCEDURE — 86780 TREPONEMA PALLIDUM: CPT | Performed by: PHYSICIAN ASSISTANT

## 2019-08-30 ASSESSMENT — ANXIETY QUESTIONNAIRES: GAD7 TOTAL SCORE: 0

## 2019-08-30 NOTE — RESULT ENCOUNTER NOTE
PLEASE CALL PATIENT: Dear Yue,      It was a pleasure to see you at your recent office visit.  Your test results are listed below.  [facility] chest xray is negative. I have referred you to allergy/asthma please schedule with them for further investigation of cough.         If you have any questions or concerns, please call the clinic at [phone].    Sincerely,  Kaylynn Cameron PA-C

## 2019-08-31 LAB — T PALLIDUM AB SER QL: NONREACTIVE

## 2019-09-01 LAB
C TRACH DNA SPEC QL NAA+PROBE: NEGATIVE
N GONORRHOEA DNA SPEC QL NAA+PROBE: NEGATIVE
SPECIMEN SOURCE: NORMAL
SPECIMEN SOURCE: NORMAL

## 2019-09-02 NOTE — RESULT ENCOUNTER NOTE
Rachel Turner,       Your recent test results are attached, if you have any questions or concerns please feel free to contact me via e-mail or call [phone]. STD testing negative. No chlamydia or gonorrhea. Always use condoms to prevent STDs. Re-check in one year or after changing sexual partners.   Triglycerides (fatty acids) elevated otherwise cholesterol is fine. a1c shows no diabetes. Kidney function normal.        It was a pleasure to see you at your recent office visit.      Sincerely,  Kaylynn Cameron PA-C

## 2019-09-05 LAB
COPATH REPORT: ABNORMAL
PAP: ABNORMAL

## 2019-09-08 NOTE — PROGRESS NOTES
1/20/15: Pap - LSIL, + HR HPV (Not types 16/18). Plan colp  2/16/15: [facility] - LSIL. Plan cotest in 1 year.   6/16/16: ASCUS Pap, + HR HPV (Not types 16/18). Plan colp  7/11/16: [facility] - negative. Plan cotest in 1 year.   11/20/17 LSIL Pap, + HR HPV (Not types 16/18) with endometrial cells. Plan colp  3/5/18 3 month [facility] not done, updated to 6mo [facility]/Pap due by 5/20/18 6/20/18 [facility] - PAVAN 1, LSIL Pap, + HR HPV (Neg 16/18). Plan cotest in 1 year.   7/24/19 Lost to follow-up for pap tracking  8/29/19 LSIL pap, + HR HPV (not 16 or 18). Plan colp. (MRA)  09/09/19 LM for pt to call back (ajk)   9/16/19 Left [facility] msg to call back (LN) Advised of result and follow up. (domenica)  10/9/19 [facility] - PAVAN 1. Plan cotest in 1 year. (elsa)  10/30/19 Pt viewed result on [employer] (elsa)

## 2019-09-19 LAB — PHQ9 SCORE: 9

## 2019-09-28 ENCOUNTER — ANCILLARY PROCEDURE (OUTPATIENT)
Dept: MAMMOGRAPHY | Facility: CLINIC | Age: 54
End: 2019-09-28
Payer: COMMERCIAL

## 2019-09-28 DIAGNOSIS — Z12.31 VISIT FOR SCREENING MAMMOGRAM: ICD-10-CM

## 2019-09-28 PROCEDURE — 77067 SCR MAMMO BI INCL CAD: CPT | Mod: TC

## 2019-09-28 PROCEDURE — 77063 BREAST TOMOSYNTHESIS BI: CPT | Mod: TC

## 2019-10-09 ENCOUNTER — OFFICE VISIT (OUTPATIENT)
Dept: FAMILY MEDICINE | Facility: CLINIC | Age: 54
End: 2019-10-09
Payer: COMMERCIAL

## 2019-10-09 VITALS
SYSTOLIC BLOOD PRESSURE: 121 MMHG | TEMPERATURE: 98 F | BODY MASS INDEX: 40.75 KG/M2 | HEART RATE: 75 BPM | WEIGHT: 230 LBS | DIASTOLIC BLOOD PRESSURE: 69 MMHG | HEIGHT: 63 IN

## 2019-10-09 DIAGNOSIS — B97.7 HIGH RISK HPV INFECTION: ICD-10-CM

## 2019-10-09 DIAGNOSIS — R87.612 LGSIL ON PAP SMEAR OF CERVIX: Primary | ICD-10-CM

## 2019-10-09 PROCEDURE — 99207 ZZC DROP WITH A PROCEDURE: CPT | Mod: 25 | Performed by: FAMILY MEDICINE

## 2019-10-09 PROCEDURE — 88305 TISSUE EXAM BY PATHOLOGIST: CPT | Performed by: FAMILY MEDICINE

## 2019-10-09 PROCEDURE — 57456 ENDOCERV CURETTAGE W/SCOPE: CPT | Performed by: FAMILY MEDICINE

## 2019-10-09 RX ORDER — FLUOXETINE 40 MG/1
CAPSULE ORAL
Refills: 0 | COMMUNITY
Start: 2019-09-19 | End: 2020-02-24 | Stop reason: DRUGHIGH

## 2019-10-09 ASSESSMENT — MIFFLIN-ST. JEOR: SCORE: 1612.4

## 2019-10-09 NOTE — PROGRESS NOTES
Colposcopy/LEEP  Date/Time: 10/9/2019 11:55 AM  Performed by: Neva Navarrete MD  Authorized by: Neva Navarrete MD     Consent:     Consent obtained:  Verbal and written    Consent given by:  Patient    Procedural risks discussed:  Bleeding, infection and repeat procedure    Patient questions answered: yes      Patient agrees, verbalizes understanding, and wants to proceed: yes      Educational handouts given: no      Instructions and paperwork completed: yes    Pre-procedure:     Prepped with: acetic acid    Indication:     Indication:  LSIL and HPV  Procedure:     Procedure: Colposcopy w/ endocervical curettage      Under satisfactory analgesia the patient was prepped and draped in the dorsal lithotomy position: yes      [place] speculum was placed in the vagina: yes      Under colposcopic examination the transition zone was seen in entirety: yes      Intracervical block was performed: no      Endocervix was curetted using a Kevorkian curette: yes      Tampon inserted: no      Monsel's solution was applied: no      Biopsy(s): yes      Location:  [facility]    Specimen to pathology: yes    Post-procedure:     Findings: Negative      Impression: Normal appearing cervix      Patient tolerance of procedure:  Patient tolerated the procedure well with no immediate complications

## 2019-10-09 NOTE — LETTER
"                                                                          Affirmation of Informed Consent for Surgery or Invasive Procedure    1.  I, (print patient's name) Yue Mays,  1965,   a.  Agree that I will have (include both the medical term and patient words):           Chief Complaint   Patient presents with     Colposcopy     Health Maintenance      b.  At [facility].     c.  The reason for this procedure is (medical condition):  LGSIL and other high risk pap.   d.  This will be done or supervised by:  Neva Forrester MD.   e.  My doctor may have help from others.  Help could include opening or closing         the wound. Help might also include taking grafts, cutting out tissue,                           implanting devices.  I have been told who will help, if known.     2.  I have talked to my doctor or health care team about:   a.  What the procedure is and what will happen.   b   How it may help me (the benefits).   c.  How it may harm me (the most likely and most serious risks).   d.  The long-term effects the procedure might have.    e.  My other choices for treatment.  The risks and benefits of those choices.    f.   What will likely happen if I say \"no\" to this procedure.    g.  How I might feel right after and how quickly I might be expected to recover.      h.  What medicines will be used to manage pain or sedate me.     3.  I agree that:  (If I do not agree with a statement, I have crossed it out and              initialed next to it.)       a.  I will ask questions.     b.  No one has promised me definite results.    c.  If serious problems are found during the procedure, the treatment may                    change.   d.  If I have \"do not resuscitate\" (DNR) wishes, I have discussed this with my                              doctor and they will be put on hold during the procedure.   e. Students and other appropriate people, approved by the facility, may watch                      " the procedure and help with tasks they are qualified for.                                                    f.  Pictures or videos may be taken. They may be used for medical or                  educational reasons only.       g. Tissues or organs removed from my body as part of the normal course of the                    procedure may be used for research or teaching purposes. They will be                  disposed of with respect.                    h.  If a staff person is exposed to my blood or body fluids, my blood will be drawn                   and tested for HIV and hepatitis.  I understand that by law, the test results will                    go:         -  In my medical record.                         -  To the Employee Occupational Health Service and/or Infection Control                                  at this facility.    -  To the [place] [facility] officials.                 i.  I may have a blood transfusion: I have talked to my doctor or care team about:    -  Why I may need a blood transfusion.     - The risks, benefits, and side effects of transfusion - and the risks of not        Having one.     -  Blood safety and other options for treatment.        Consent for blood transfusion obtained during a hospital admission is valid for the entire hospital stay.  Consent  for blood transfusion obtained in the clinic setting is valid for a year from the signature date.                                4.  I understand that:   a.  I can change my mind.  If I do, I must tell my doctor or team as soon as                           possible.              b.  The team members may change during the procedure.                c.   The team will double-check who I am.  They will ask me what I am having                         done and the site of the site of the procedure.  This is done for my safety.    My questions have been answered.  I agree to the procedure.  I have made my special needs and instructions  known.      Yue Davon      10/9/2019 11:12 AM  Patient (or representative)/Relationship to patient             Date  Time        Witness:  I have verified that the signature is that of the patient or patient's representative and that this has been signed before the procedure:    NAME:        10/9/2019  11:12 AM         Date  Time  Person verifying patient's name or patient representative's signature     Provider:  I have discussed the procedure and the information stated above with the patient (or the patient's representative) and answered their questions. The patient or their representative consented to the procedure:      Neva Forrester MD    10/9/2019  11:12 AM  Physician or Provider's Signature(s)   Date  Time           Consent for procedure valid for 30 days after patient signature date     [facility]  AFFIRMATION OF INFORMED CONSENT FOR SURGERY OR INVASIVE PROCEDURE               Original - Medical Records

## 2019-10-13 LAB — COPATH REPORT: NORMAL

## 2019-10-16 ENCOUNTER — TRANSFERRED RECORDS (OUTPATIENT)
Dept: HEALTH INFORMATION MANAGEMENT | Facility: CLINIC | Age: 54
End: 2019-10-16

## 2019-10-16 LAB — PHQ9 SCORE: 8

## 2020-02-19 NOTE — PROGRESS NOTES
Subjective     Yue Mays is a 54 year old female who presents to clinic today for the following health issues:    HPI     Memory loss SX per pt on and off for a few years now, feels like it could be due to menopausal sx and needing labs completed. Sleeping issue on and off for a few years as well. Requesting labs as well to check?  Patient has a long list of labs she wants. Some she has had done before and doesn't want repeated. We discussed I don't do cortisol testing and she would have to see endocrine if she wanted to puruse specific testing. Her therapist recommended it per patient which I have  A hard time understanding. Patient may have googled this as well is my suspicion.     Wakes frequently when sleeping. Can be up for 1-2 hours or less. Has not had a sleep study. Doesn't snore that she is aware of. No known apnea.   Needs sleep study. Poor sleep could lead to difficulty functioning and remembering during the day.  Is morbidly obese also is at risk for apnea.     Last period-is late by a week. Before went from august to January. Is in perimenopause. Wants to know about HRT we discussed that is for hot flashes or vaginal dryness not memory loss and fatigue. She states understanding but still requests labs be done. We discussed they will not be meaningful or dictate treatment.       Patient has mood disorder--Medication management through PA-C through [facility]. On wellbutrin and prozac.   Seeing therapist through [facility] also.   Had trouble remembering things from her childhood which concerned therapist.   Has noticed some memory changes about recent things also. (short term and long term). This is really concerning to patient.             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     Cystocele, midline     Urgency of urination     Female stress incontinence     Cystocele     Papanicolaou smear  of cervix with low grade squamous intraepithelial lesion (LGSIL)     Depression with anxiety     Infected sebaceous cyst     Cough     BMI 40.0-44.9, adult (H)     Past Surgical History:   Procedure Laterality Date     BIOPSY  1989    had a 2 colposcopies for moderate displaysia     COLONOSCOPY N/A 12/31/2015    Procedure: COLONOSCOPY;  Surgeon: Arpit Tony MD;  Location: [facility]     COLONOSCOPY N/A 12/31/2015    Procedure: COMBINED COLONOSCOPY, SINGLE OR MULTIPLE BIOPSY/POLYPECTOMY BY BIOPSY;  Surgeon: Arpit Tony MD;  Location: [facility]     COLONOSCOPY WITH CO2 INSUFFLATION N/A 12/31/2015    Procedure: COLONOSCOPY WITH CO2 INSUFFLATION;  Surgeon: Arpit Tony MD;  Location: [facility]     COLPORRHAPHY ANTERIOR  8/21/2012    Procedure: COLPORRHAPHY ANTERIOR;  Cardinal Ligament Repair with Anterior Colporrhaphy and a Midurethral Sling;  Surgeon: Valentine Vera MD;  Location: [facility]     GENITOURINARY SURGERY  8/2013    3 slings put in     SLING TRANSVAGINAL  8/21/2012    Procedure: SLING TRANSVAGINAL;;  Surgeon: Valentine Vera MD;  Location: [facility]     SLING TRANSVAGINAL  11/12/2012    Procedure: SLING TRANSVAGINAL;  Cysto with TVT (transobturator approach);  Surgeon: Valentine Vera MD;  Location: [facility]       Social History     Tobacco Use     Smoking status: Never Smoker     Smokeless tobacco: Never Used   Substance Use Topics     Alcohol use: Not Currently     Alcohol/week: 0.0 standard drinks     Comment: minimal - maybe once a month     Family History   Problem Relation Age of Onset     Cancer Mother         hodgkins     Breast Cancer Mother         early 30s      Other Cancer Mother         Hodgkin's Disease, Breast Cancer     Arthritis Father      Diabetes Father         I don't recall if he had diabetes     Hypertension Father      Depression/Anxiety Father         undiagnosed     Depression/Anxiety Sister         undiagnosed     Depression Daughter      Anxiety Disorder  "Daughter      Depression Daughter      Anxiety Disorder Daughter          Current Outpatient Medications   Medication Sig Dispense Refill     buPROPion (WELLBUTRIN) 75 MG tablet        FLUoxetine (PROZAC) 20 MG capsule Take 20 mg by mouth daily       FLUoxetine (PROZAC) 40 MG capsule Take 40 mg by mouth daily       Allergies   Allergen Reactions     Nkda [No Known Drug Allergies]          Reviewed and updated as needed this visit by Provider         Review of Systems   ROS COMP: Constitutional, HEENT, cardiovascular, pulmonary, GI, , musculoskeletal, neuro, skin, endocrine and psych systems are negative, except as otherwise noted.      Objective    /83   Pulse 103   Temp 97.9  F (36.6  C) (Oral)   Resp 16   Ht 1.6 m (5' 3\")   Wt 104.3 kg (230 lb)   SpO2 95%   Breastfeeding No   BMI 40.74 kg/m    Body mass index is 40.74 kg/m .  Physical Exam   GENERAL: healthy, alert and no distress  NECK: no adenopathy, no asymmetry, masses, or scars and thyroid normal to palpation  RESP: lungs clear to auscultation - no rales, rhonchi or wheezes  CV: regular rate and rhythm, normal S1 S2, no S3 or S4, no murmur, click or rub, no peripheral edema and peripheral pulses strong  MS: no gross musculoskeletal defects noted, no edema  SKIN: no suspicious lesions or rashes  NEURO: Normal strength and tone, mentation intact and speech normal  PSYCH: mentation appears normal, affect normal/bright    Diagnostic Test Results:  Labs reviewed in Epic        Assessment & Plan     1. BMI 40.0-44.9, adult (H)    - SLEEP EVALUATION & MANAGEMENT REFERRAL - [facility] [place]  [phone] (Age 15 and up); Future    2. Depression with anxiety  Continue with psych    3. Fatigue, unspecified type  Likely from poor sleep and obesity  Patient should get sleep study to look for apnea  Will run labs also  F/u with endocrine in future if needed and for  weight management  - SLEEP EVALUATION & MANAGEMENT REFERRAL - " ADULT -[facility] - [place]  [phone] (Age 15 and up); Future  - TSH with free T4 reflex  - CBC with platelets differential  - Vitamin D Deficiency    4. Irregular periods  Likely perimenopausal see hpi  - Progesterone  - Estradiol  - Follicle stimulating hormone  - Lutropin    5. Memory changes  Likely from poor sleep, depression, but with short term and long term memory and patient concern will refer. May need neuropsych testing in future  Unlikely dementia or other neuro disorder but possible    - NEUROLOGY ADULT REFERRAL    Billing: Face to face time greater than 40 minutes with greater than 50% in counseling on patients memory and fatigue symptoms and workup discussion.      Kaylynn Cameron PA-C  [facility]

## 2020-02-24 ENCOUNTER — OFFICE VISIT (OUTPATIENT)
Dept: FAMILY MEDICINE | Facility: CLINIC | Age: 55
End: 2020-02-24
Payer: COMMERCIAL

## 2020-02-24 VITALS
HEART RATE: 103 BPM | DIASTOLIC BLOOD PRESSURE: 83 MMHG | RESPIRATION RATE: 16 BRPM | HEIGHT: 63 IN | BODY MASS INDEX: 40.75 KG/M2 | OXYGEN SATURATION: 95 % | SYSTOLIC BLOOD PRESSURE: 137 MMHG | TEMPERATURE: 97.9 F | WEIGHT: 230 LBS

## 2020-02-24 DIAGNOSIS — N92.6 IRREGULAR PERIODS: ICD-10-CM

## 2020-02-24 DIAGNOSIS — R53.83 FATIGUE, UNSPECIFIED TYPE: ICD-10-CM

## 2020-02-24 DIAGNOSIS — F41.8 DEPRESSION WITH ANXIETY: Primary | ICD-10-CM

## 2020-02-24 DIAGNOSIS — R41.3 MEMORY CHANGES: ICD-10-CM

## 2020-02-24 PROBLEM — E66.09 CLASS 2 OBESITY DUE TO EXCESS CALORIES WITHOUT SERIOUS COMORBIDITY WITH BODY MASS INDEX (BMI) OF 39.0 TO 39.9 IN ADULT: Status: RESOLVED | Noted: 2019-08-29 | Resolved: 2020-02-24

## 2020-02-24 PROBLEM — E66.812 CLASS 2 OBESITY DUE TO EXCESS CALORIES WITHOUT SERIOUS COMORBIDITY WITH BODY MASS INDEX (BMI) OF 39.0 TO 39.9 IN ADULT: Status: RESOLVED | Noted: 2019-08-29 | Resolved: 2020-02-24

## 2020-02-24 LAB
BASOPHILS # BLD AUTO: 0 10E9/L (ref 0–0.2)
BASOPHILS NFR BLD AUTO: 0.3 %
DIFFERENTIAL METHOD BLD: NORMAL
EOSINOPHIL # BLD AUTO: 0 10E9/L (ref 0–0.7)
EOSINOPHIL NFR BLD AUTO: 0.7 %
ERYTHROCYTE [DISTWIDTH] IN BLOOD BY AUTOMATED COUNT: 13.2 % (ref 10–15)
ESTRADIOL SERPL-MCNC: 24 PG/ML
FSH SERPL-ACNC: 21.8 IU/L
HCT VFR BLD AUTO: 44 % (ref 35–47)
HGB BLD-MCNC: 14.5 G/DL (ref 11.7–15.7)
LH SERPL-ACNC: 20.5 IU/L
LYMPHOCYTES # BLD AUTO: 1.8 10E9/L (ref 0.8–5.3)
LYMPHOCYTES NFR BLD AUTO: 29.1 %
MCH RBC QN AUTO: 29.8 PG (ref 26.5–33)
MCHC RBC AUTO-ENTMCNC: 33 G/DL (ref 31.5–36.5)
MCV RBC AUTO: 90 FL (ref 78–100)
MONOCYTES # BLD AUTO: 0.6 10E9/L (ref 0–1.3)
MONOCYTES NFR BLD AUTO: 9.4 %
NEUTROPHILS # BLD AUTO: 3.7 10E9/L (ref 1.6–8.3)
NEUTROPHILS NFR BLD AUTO: 60.5 %
PLATELET # BLD AUTO: 326 10E9/L (ref 150–450)
PROGEST SERPL-MCNC: 0.2 NG/ML
RBC # BLD AUTO: 4.87 10E12/L (ref 3.8–5.2)
WBC # BLD AUTO: 6.1 10E9/L (ref 4–11)

## 2020-02-24 PROCEDURE — 84443 ASSAY THYROID STIM HORMONE: CPT | Performed by: PHYSICIAN ASSISTANT

## 2020-02-24 PROCEDURE — 99215 OFFICE O/P EST HI 40 MIN: CPT | Performed by: PHYSICIAN ASSISTANT

## 2020-02-24 PROCEDURE — 82670 ASSAY OF TOTAL ESTRADIOL: CPT | Performed by: PHYSICIAN ASSISTANT

## 2020-02-24 PROCEDURE — 85025 COMPLETE CBC W/AUTO DIFF WBC: CPT | Performed by: PHYSICIAN ASSISTANT

## 2020-02-24 PROCEDURE — 36415 COLL VENOUS BLD VENIPUNCTURE: CPT | Performed by: PHYSICIAN ASSISTANT

## 2020-02-24 PROCEDURE — 82306 VITAMIN D 25 HYDROXY: CPT | Performed by: PHYSICIAN ASSISTANT

## 2020-02-24 PROCEDURE — 84144 ASSAY OF PROGESTERONE: CPT | Performed by: PHYSICIAN ASSISTANT

## 2020-02-24 PROCEDURE — 83002 ASSAY OF GONADOTROPIN (LH): CPT | Performed by: PHYSICIAN ASSISTANT

## 2020-02-24 PROCEDURE — 83001 ASSAY OF GONADOTROPIN (FSH): CPT | Performed by: PHYSICIAN ASSISTANT

## 2020-02-24 RX ORDER — BUPROPION HYDROCHLORIDE 75 MG/1
TABLET ORAL
COMMUNITY
Start: 2020-02-04 | End: 2024-01-18

## 2020-02-24 RX ORDER — FLUOXETINE 40 MG/1
40 CAPSULE ORAL DAILY
COMMUNITY
End: 2024-01-18

## 2020-02-24 ASSESSMENT — MIFFLIN-ST. JEOR: SCORE: 1612.4

## 2020-02-25 LAB
DEPRECATED CALCIDIOL+CALCIFEROL SERPL-MC: 37 UG/L (ref 20–75)
TSH SERPL DL<=0.005 MIU/L-ACNC: 1.72 MU/L (ref 0.4–4)

## 2020-02-25 NOTE — RESULT ENCOUNTER NOTE
Rachel Turner,       Your recent test results are attached, if you have any questions or concerns please feel free to contact me via e-mail or call [phone].  As expected labs are all normal. Vitamin d is normal. Thyroid is normal. Female hormones normal for what you were telling me was happening with your periods. FSH is not placing you post-menopausal yet but I suspect in another year you will be or very close. Do you want a referral to endocrine also or start with sleep study and neurology as I suggested?  White and red blood cell counts normal. No anemia.       Sincerely,  Kaylynn Cameron PA-C

## 2020-03-03 LAB — PHQ9 SCORE: 6

## 2020-03-11 LAB — PHQ9 SCORE: 8

## 2020-04-14 VITALS — HEIGHT: 63 IN | WEIGHT: 220 LBS | BODY MASS INDEX: 38.98 KG/M2

## 2020-04-14 PROBLEM — L72.3 INFECTED SEBACEOUS CYST: Status: RESOLVED | Noted: 2018-04-27 | Resolved: 2020-04-14

## 2020-04-14 PROBLEM — L08.9 INFECTED SEBACEOUS CYST: Status: RESOLVED | Noted: 2018-04-27 | Resolved: 2020-04-14

## 2020-04-14 ASSESSMENT — MIFFLIN-ST. JEOR: SCORE: 1567.04

## 2020-04-14 NOTE — PATIENT INSTRUCTIONS
Your BMI is Body mass index is 38.97 kg/m .  Weight management is a personal decision.  If you are interested in exploring weight loss strategies, the following discussion covers the approaches that may be successful. Body mass index (BMI) is one way to tell whether you are at a healthy weight, overweight, or obese. It measures your weight in relation to your height.  A BMI of 18.5 to 24.9 is in the healthy range. A person with a BMI of 25 to 29.9 is considered overweight, and someone with a BMI of 30 or greater is considered obese. More than two-thirds of American adults are considered overweight or obese.  Being overweight or obese increases the risk for further weight gain. Excess weight may lead to heart disease and diabetes.  Creating and following plans for healthy eating and physical activity may help you improve your health.  Weight control is part of healthy lifestyle and includes exercise, emotional health, and healthy eating habits. Careful eating habits lifelong are the mainstay of weight control. Though there are significant health benefits from weight loss, long-term weight loss with diet alone may be very difficult to achieve- studies show long-term success with dietary management in less than 10% of people. Attaining a healthy weight may be especially difficult to achieve in those with severe obesity. In some cases, medications, devices and surgical management might be considered.  What can you do?  If you are overweight or obese and are interested in methods for weight loss, you should discuss this with your provider.     Consider reducing daily calorie intake by 500 calories.     Keep a food journal.     Avoiding skipping meals, consider cutting portions instead.    Diet combined with exercise helps maintain muscle while optimizing fat loss. Strength training is particularly important for building and maintaining muscle mass. Exercise helps reduce stress, increase energy, and improves fitness.  Increasing exercise without diet control, however, may not burn enough calories to loose weight.       Start walking three days a week 10-20 minutes at a time    Work towards walking thirty minutes five days a week     Eventually, increase the speed of your walking for 1-2 minutes at time    In addition, we recommend that you review healthy lifestyles and methods for weight loss available through the National Institutes of Health patient information sites:  http://win.niddk.nih.gov/publications/index.htm    And look into health and wellness programs that may be available through your health insurance provider, employer, local community center, or [employer].    Weight management plan: Patient was referred to their PCP to discuss a diet and exercise plan.    MY CONTACT NUMBERS ARE:   DOCTOR : Peterson Barboza PA-C  SLEEP CENTER : [phone]  CPAP EQUIPMENT :    IF I HAVE SLEEP APNEA.....  WHERE CAN I FIND MORE INFORMATION?    American Academy of Sleep Medicine Patient information on sleep disorders:  http://yoursleep.aasmnet.org    THINGS TO REMEMBER  In most situations, sleep apnea is a lifelong disease that must be managed with daily therapy. Untreated disease, when severe, may result in an increased risk for an array of problems from heart disease to mood changes, car accidents and shorter lifespan.    CPAP -  WHY AND HOW?  Continuous positive airway pressure, or CPAP, is the most effective treatment for obstructive sleep apnea. A decision to use CPAP is a major step forward in the pursuit of a healthier life. The successful use of CPAP will help you breathe easier, sleep better and live healthier. Using CPAP can be a positive experience if you keep these santiago points in mind:  1. Commitment  CPAP is not a quick fix for your problem. It involves a long-term commitment to improve your sleep and your health.    2. Communication  Stay in close communication with both your sleep doctor and your CPAP supplier. Ask lots  "of questions and seek help when you need it.    3. Consistency  Use CPAP all night, every night and for every nap. You will receive the maximum health benefits from CPAP when you use it every time that you sleep. This will also make it easier for your body to adjust to the treatment.    4. Correction  The first machine and mask that you try may not be the best ones for you. Work with your sleep doctor and your CPAP supplier to make corrections to your equipment selection. Ask about trying a different type of machine or mask if you have ongoing problems. Make sure that your mask is a good fit and learn to use your equipment properly.    5. Challenge  Tell a family member or close friend to ask you each morning if you used your CPAP the previous night. Have someone to challenge you to give it your best effort.    6. Connection   Your adjustment to CPAP will be easier if you are able to connect with others who use the same treatment. Ask your sleep doctor if there is a support group in your area for people who have sleep apnea, or look for one on the Internet.    7. Comfort   Increase your level of comfort by using a saline spray, decongestant or heated humidifier if CPAP irritates your nose, mouth or throat. Use your unit's \"ramp\" setting to slowly get used to the air pressure level. There may be soft pads you can buy that will fit over your mask straps. Look on www.CPAP.com for accessories such as these straps, a pillow contoured for side-sleeping with CPAP, longer hoses, hose covers to reduce condensation, or stands to keep the hose out of your way.                                 8. Cleaning   Clean your mask, tubing and headgear on a regular basis. Put this time in your schedule so that you don't forget to do it. Check and replace the filters for your CPAP unit and humidifier.    9. Completion   Although you are never finished with CPAP therapy, you should reward yourself by celebrating the completion of your " first month of treatment. Expect this first month to be your hardest period of adjustment. It will involve some trial and error as you find the machine, mask and pressure settings that are right for you.    Continuation  After your first month of treatment, continue to make a daily commitment to use your CPAP all night, every night and for every nap.    CPAP-Tips to starting with success:  Begin using your CPAP for short periods of time during the day while you watch TV or read.    Use CPAP every night and for every nap. Using it less often reduces the health benefits and makes it harder for your body to get used to it.    Newer CPAP models are virtually silent; however, if you find the sound of your CPAP machine to be bothersome, place the unit under your bed to dampen the sound.     Make small adjustments to your mask, tubing, straps and headgear until you get the right fit. Tightening the mask may actually worsen the leak.  If it leaves significant marks on your face or irritates the bridge of your nose, it may not be the best mask for you.  Speak with the person who supplied the mask and consider trying other masks.    Use a saline nasal spray to ease mild nasal congestion. Neti-Pot or saline nasal rinses may also help. Nasal gel sprays can help reduce nasal dryness.  Biotene mouthwash can be helpful to protect your teeth if you experience frequent dry mouth.  Dry mouth may be a sign of air escaping out of your mouth or out of the mask in the case of a full face mask.  Speak with your provider if you expect that is the case.     Take a nasal decongestant to relieve more severe nasal or sinus congestion.  Do not use Afrin (oxymetazoline) nasal spray more than 3 days in a row.  Speak with your sleep doctor if your nasal congestion is chronic.    Use a heated humidifier that fits your CPAP model to enhance your breathing comfort. Adjust the heat setting up if you get a dry nose or throat, down if you get  condensation in the hose or mask.  Position the CPAP lower than you so that any condensation in the hose drains back into the machine rather than towards the mask.    Try a system that uses nasal pillows if traditional masks give you problems.    Clean your mask, tubing and headgear once a week. Make sure the equipment dries fully.    Regularly check and replace the filters for your CPAP unit and humidifier.    Work closely with your sleep doctor and your CPAP supplier to make sure that you have the machine, mask and air pressure setting that works best for you.    BESIDES CPAP, WHAT OTHER THERAPIES ARE THERE?    Postioning devices if you only have the problem on your back    Dental devices if your condition is mild    Nasal valves may be effective though experience is limited    Tongue Retaining Device if missing teeth precludes the use of a dental device    Weight loss if you are overweight    Surgery in limited cases where devices are not acceptable or there are problems with structures in the nose and throat  If treated with one of these alternative options, further evaluation is necessary to ensure that the therapy is effective. This may require some form of testing.     Healthy Lifestyle:  Healthy diet, exercise and Limit alcohol: Not only will excessive alcohol increase your weight over time, but it irritates the throat tissues and make them swell, shrinking the airway and causing snoring. Drinking alcohol should be limited and stopped within 3-4 hours before going to bed.   Stop smoking: (Red swollen throat, heat, nicotine), also irritates and swells the airway, among numerous other negative health consequences.    Positioning Device  This example shows a pillow that straps around the waist. It may be appropriate for those whose sleep study shows milder sleep apnea that occurs primarily when lying flat on one's back. Preliminary studies have shown benefit but effectiveness at home should be  verified.    Nasal Valves              Nasal valves may not be effective if you have frequent nasal congestion or have difficulty breathing through your nose. They may be an option for mild apnea if other options are not well tolerated. The efficacy of these devices is generally less than CPAP or oral appliances.  Oral Appliance  These are examples of two of many custom-made devices that are more likely to work in mild sleep apnea  Oral appliances are dental mouth pieces that fit very much like a sports mouth guards or removable orthodontic retainers. They are used to treat snoring  And obstructive sleep apnea . The device prevents the airway from collapsing by either holding the tongue or supporting the jaw in a forward position. Since oral appliances are non-invasive and easy to use, they may be considered as an early treatment option. Oral appliance therapy (OAT) involves the customization, selection, fabrication, fitting, adjustments and long-term follow-up care of specially designed oral devices, worn during sleep, which reposition the lower jaw and tongue base forward to maintain an open airway.  Custom made oral appliances are proven to be more effective than over-the-counter devices. Therefore, the over-the-counter devices are recommended not to be used as a screening tool nor as a therapeutic option.  Who gets a dental device?  Oral appliance therapy can be used as an alternative to  CPAP therapy for the treatment of mild to moderate sleep apnea and for those patients who prefer OAT to CPAP. Oral appliance therapy is a first line therapy for the treatment of primary snoring. Additionally, OAT is an option for those that cannot tolerate CPAP as therapy or who have experienced insufficient surgical results.    Possible side effects?  Frequent but minor side effects include: excessive salivation, dry mouth, discomfort of teeth and jaw and temporary changes in the patient s bite.  Potential complications  include: jaw pain, permanent occlusal changes and TMJ symptoms.  The above mentioned side effects and complications can be recognized and managed by dentists trained in dental sleep medicine.    Finding a dentist that practices dental sleep medicine  Specific training is available through the American Academy of Dental Sleep Medicine for dentists interested in working in the field of sleep. To find a dentist who is educated in the field of sleep and the use of oral appliances, near you, visit the Web site of the American Academy of Dental Sleep Medicine; also see http://www.accpstorage.org/newOrganization/patients/oralAppliances.pdf   To search for a dentist certified in these practices:  Http://aadsm.org/FindADentist.aspx?1  Http://www.accpstorage.org/newOrganization/patients/oralAppliances.pdf    Tongue Retaining Device               Tongue Retaining Devices are devices that generally  suction cup  onto the tongue preventing it from falling back into the back of the throat during sleep.  They may be an option for people missing teeth, but can be uncomfortable. This particular device can be purchased online, but similar devices made by dentists fit more precisely and may be tolerated better. In general, they are rarely effective and often not very well tolerated.    Weight Loss:  Some patients may experience reduction or elimination of sleep apnea with weight loss.  Though there are significant health benefits from weight loss, long-term weight loss is very difficult to achieve- studies show success with dietary management in less that 10% of people.     If you are interested in dietary weight loss, you should review the options discussed at the National Institutes of Health patient information site:     Http:/www.health.nih.gov/topic/WeightLossDieting    Bariatric programs offer counseling in all methods of weight  "loss:    Http:/www.[facility].org/Specialties/WeightLossSurgeryandMedicalMgmt/htm    Surgery:  There are a number of surgeries that have been attempted to treat apnea. In general, surgical options are usually reserved for cases in which there is a physical abnormality contributing to obstruction or other treatment options are ineffective or not tolerated. Most surgical options are either unreliable or quite invasive. One of the more common procedures is:  Uvulopalatopharyngoplasty: In this procedure, the uvula (the finger-like tissue that hangs in the back of the throat), part of the soft palate (the tissue that the uvula is attached to), and sometimes the tonsils or adenoids are removed. The efficacy of this surgery is around 30-50% .  After surgery, complications may include:  Sleepiness and sleep apnea related to post-surgery medication   Swelling, infection and bleeding   A sore throat and/or difficulty swallowing   Drainage of secretions into the nose and a nasal quality to the voice. English language speech does not seem to be affected by this surgery.   Narrowing of the airway in the nose and throat (hence constricting breathing) snoring and even iatrogenically caused sleep apnea. By cutting the tissues, excess scar tissue can \"tighten\" the airway and make it even smaller than it was before UPPP.  Patients who have had the uvula removed will become unable to correctly speak Serbian or any other language that has a uvular 'r' phoneme.    Surgeries to help resolve nasal congestion may help reduce the severity of apnea slightly. Nasal congestion does not cause apnea on its own, so these surgeries are usually not performed just for ANISH.  They may be worth considering if the nasal congestion is significantly bothersome independent of apnea.    "

## 2020-04-14 NOTE — PROGRESS NOTES
"Yue Mays is a 54 year old female who is being evaluated via a billable telephone visit.      The patient has been notified of following:     \"This telephone visit will be conducted via a call between you and your physician/provider. We have found that certain health care needs can be provided without the need for a physical exam.  This service lets us provide the care you need with a short phone conversation.  If a prescription is necessary we can send it directly to your pharmacy.  If lab work is needed we can place an order for that and you can then stop by our lab to have the test done at a later time.    Telephone visits are billed at different rates depending on your insurance coverage. During this emergency period, for some insurers they may be billed the same as an in-person visit.  Please reach out to your insurance provider with any questions.    If during the course of the call the physician/provider feels a telephone visit is not appropriate, you will not be charged for this service.\"    Patient has given verbal consent for Telephone visit?  Yes    How would you like to obtain your AVS? MyChart     Total TIME: 35 minutes    Clinic visit changed to phone visit due to COVID-19      Sleep Consultation:    Date on this visit: 4/15/2020    Yue Mays is sent by Kaylynn Cameron for a sleep consultation regarding frequent arousals and excessive daytime sleepiness.    Primary Physician: Kaylynn Cameron     CC: \"To have sleep issues looked at\"    Yue goes to sleep at 10:30 PM during the week. She wakes up at 6:00-7:00 AM with an alarm. She falls asleep in 5 inutes. Yue has no difficulty falling asleep. Her mind is overactive at night.  She wakes up 2-3 times a night for 30 minutes before falling back to sleep.  Yue wakes up to go to the bathroom.  On weekends, Yue goes to sleep at 10:30 PM.  She wakes up at 9:00 AM with an alarm. She falls asleep in 5 minutes.  Patient gets an average " of 5-6 hours of sleep per night. She does not feel refreshed.    Patient does use electronics in bed and does not watch TV in bed.     Yue does not do shift work.  She is currently unemployed.     Yue does snore some nights and snoring is soft. Patient does have a regular bed partner. There is not report of kicking and punching. There is reports of twitching.  She does not have witnessed apneas. They frequently sleep separately.  Patient sleeps on her side. She has occasional morning headaches (2/month), she reports infrequent symptoms of RLS. Yue denies any bruxism, sleep walking, sleep talking, dream enactment, sleep paralysis, cataplexy and hypnogogic/hypnopompic hallucinations.    Yue denies difficulty breathing through her nose, claustrophobia and reflux at night.      Yue has gained 60 pounds in 5 years.  Patient describes themself as a morning person.  She would prefer to go to sleep at 10:30 PM and wake up at 7:00 AM.  Patient's [place] Sleepiness score 5/24 inconsistent with excessive  daytime sleepiness.  She has fatigue.    Yue naps 2 times per month for  minutes, feels refreshed after naps. She takes some inadvertant naps.  She denies falling asleep while driving.  Patient was counseled on the importance of driving while alert, to pull over if drowsy, or nap before getting into the vehicle if sleepy.  She uses occasions tea.     Allergies:    Allergies   Allergen Reactions     Nkda [No Known Drug Allergies]        Medications:    Current Outpatient Medications   Medication Sig Dispense Refill     buPROPion (WELLBUTRIN) 75 MG tablet        FLUoxetine (PROZAC) 20 MG capsule Take 20 mg by mouth daily       FLUoxetine (PROZAC) 40 MG capsule Take 40 mg by mouth daily         Problem List:  Patient Active Problem List    Diagnosis Date Noted     BMI 40.0-44.9, adult (H) 02/24/2020     Priority: Medium     Fatigue, unspecified type 02/24/2020     Priority: Medium     Memory changes 02/24/2020      Priority: Medium     Cough 08/29/2019     Priority: Medium     Depression with anxiety 07/12/2016     Priority: Medium     Papanicolaou smear of cervix with low grade squamous intraepithelial lesion (LGSIL) 01/20/2015     Priority: Medium     1/20/15: Pap - LSIL, + HR HPV (Not types 16/18). Plan colp  2/16/15: [place] - LSIL. Plan cotest in 1 year.   6/16/16: ASCUS Pap, + HR HPV (Not types 16/18). Plan colp  7/11/16: [place] ECC - negative. Plan cotest in 1 year.   11/20/17 LSIL Pap, + HR HPV (Not types 16/18) with endometrial cells. Plan colp  3/5/18 3 month [place] not done, updated to 6mo [place]/Pap due by 5/20/18 6/20/18 [facility] ECC - PAVAN 1, LSIL Pap, + HR HPV (Neg 16/18). Plan cotest in 1 year.   7/24/19 Lost to follow-up for pap tracking  8/29/19 LSIL pap, + HR HPV (not 16 or 18). Plan colp.   10/9/19 [place]: ECC with CIN1, Plan: cotest in one year         Cystocele, midline 07/23/2012     Priority: Medium     Urgency of urination 07/23/2012     Priority: Medium     Female stress incontinence 07/23/2012     Priority: Medium     Urinary incontinence due to urethral sphincter incompetence 06/12/2012     Priority: Medium     CARDIOVASCULAR SCREENING; LDL GOAL LESS THAN 160 10/31/2010     Priority: Medium        Past Medical/Surgical History:  Past Medical History:   Diagnosis Date     Abnormal Pap smear of cervix 1/2015, 2016, 2017, 8/29/19    See problem list     Cervical high risk HPV (human papillomavirus) test positive 2015, 06/16/2016, 2017, 8/29/19    See problem list     H/O colposcopy with cervical biopsy 2/2015    LSIL     History of colposcopy 7/11/16    ECC - negative     Infected sebaceous cyst 4/27/2018     Mild dysplasia of cervix (PAVAN I) 2019    see problem list     Urinary incontinence      Past Surgical History:   Procedure Laterality Date     BIOPSY  1989    had a 2 colposcopies for moderate displaysia     COLONOSCOPY N/A 12/31/2015    Procedure: COLONOSCOPY;  Surgeon: Arpit Tony MD;   Location: [facility]     COLONOSCOPY N/A 12/31/2015    Procedure: COMBINED COLONOSCOPY, SINGLE OR MULTIPLE BIOPSY/POLYPECTOMY BY BIOPSY;  Surgeon: Arpit Tony MD;  Location: [facility]     COLONOSCOPY WITH CO2 INSUFFLATION N/A 12/31/2015    Procedure: COLONOSCOPY WITH CO2 INSUFFLATION;  Surgeon: Arpit Tony MD;  Location: [facility]     COLPORRHAPHY ANTERIOR  8/21/2012    Procedure: COLPORRHAPHY ANTERIOR;  Cardinal Ligament Repair with Anterior Colporrhaphy and a Midurethral Sling;  Surgeon: Valentine Vera MD;  Location: [facility]     GENITOURINARY SURGERY  8/2013    3 slings put in     SLING TRANSVAGINAL  8/21/2012    Procedure: SLING TRANSVAGINAL;;  Surgeon: Valentine Vera MD;  Location: [facility]     SLING TRANSVAGINAL  11/12/2012    Procedure: SLING TRANSVAGINAL;  Cysto with TVT (transobturator approach);  Surgeon: Valentine Vera MD;  Location: [facility]       Social History:  Social History     Socioeconomic History     Marital status:      Spouse name: Not on file     Number of children: Not on file     Years of education: Not on file     Highest education level: Not on file   Occupational History     Not on file   Social Needs     Financial resource strain: Not on file     Food insecurity     Worry: Not on file     Inability: Not on file     Transportation needs     Medical: Not on file     Non-medical: Not on file   Tobacco Use     Smoking status: Never Smoker     Smokeless tobacco: Never Used   Substance and Sexual Activity     Alcohol use: Not Currently     Alcohol/week: 0.0 standard drinks     Comment: minimal - maybe once a month     Drug use: No     Sexual activity: Yes     Partners: Male     Birth control/protection: Condom, Male Surgical   Lifestyle     Physical activity     Days per week: Not on file     Minutes per session: Not on file     Stress: Not on file   Relationships     Social connections     Talks on phone: Not on file     Gets together: Not on file     Attends Restorationism  service: Not on file     Active member of club or organization: Not on file     Attends meetings of clubs or organizations: Not on file     Relationship status: Not on file     Intimate partner violence     Fear of current or ex partner: Not on file     Emotionally abused: Not on file     Physically abused: Not on file     Forced sexual activity: Not on file   Other Topics Concern     Parent/sibling w/ CABG, MI or angioplasty before 65F 55M? No   Social History Narrative     Not on file       Family History:  Family History   Problem Relation Age of Onset     Cancer Mother         hodgkins     Breast Cancer Mother         early 30s      Other Cancer Mother         Hodgkin's Disease, Breast Cancer     Arthritis Father      Diabetes Father         I don't recall if he had diabetes     Hypertension Father      Depression/Anxiety Father         undiagnosed     Depression/Anxiety Sister         undiagnosed     Depression Daughter      Anxiety Disorder Daughter      Depression Daughter      Anxiety Disorder Daughter        Review of Systems:  A complete review of systems reviewed by me is negative with the exeption of what has been mentioned in the history of present illness.  CONSTITUTIONAL: NEGATIVE for weight gain/loss, fever, chills, sweats or night sweats, drug allergies.  EYES: NEGATIVE for changes in vision, blind spots, double vision.  ENT: NEGATIVE for ear pain, sore throat, sinus pain, post-nasal drip, runny nose, bloody nose  CARDIAC: NEGATIVE for fast heartbeats or fluttering in chest, chest pain or pressure, breathlessness when lying flat, swollen legs or swollen feet.  NEUROLOGIC: NEGATIVE headaches, weakness or numbness in the arms or legs.  DERMATOLOGIC: NEGATIVE for rashes, new moles or change in mole(s)  PULMONARY: NEGATIVE SOB at rest, SOB with activity, dry cough, productive cough, coughing up blood, wheezing or whistling when breathing.    GASTROINTESTINAL: NEGATIVE for nausea or vomitting, loose or  "watery stools, fat or grease in stools, constipation, abdominal pain, bowel movements black in color or blood noted.  GENITOURINARY: NEGATIVE for pain during urination, blood in urine, urinating more frequently than usual, irregular menstrual periods.  MUSCULOSKELETAL: NEGATIVE for muscle pain, bone or joint pain, swollen joints.  ENDOCRINE: NEGATIVE for increased thirst or urination, diabetes.  LYMPHATIC: NEGATIVE for swollen lymph nodes, lumps or bumps in the breasts or nipple discharge.    Physical Examination:  Vitals: Ht 1.6 m (5' 3\")   Wt 99.8 kg (220 lb)   BMI 38.97 kg/m    BMI= Body mass index is 38.97 kg/m .         [place] Total Score 4/14/2020   Total score - [place] 5       PATIENCE Total Score: 24 (04/14/20 1221)    Last Comprehensive Metabolic Panel:  Sodium   Date Value Ref Range Status   08/30/2019 137 133 - 144 mmol/L Final     Potassium   Date Value Ref Range Status   08/30/2019 4.2 3.4 - 5.3 mmol/L Final     Chloride   Date Value Ref Range Status   08/30/2019 105 94 - 109 mmol/L Final     Carbon Dioxide   Date Value Ref Range Status   08/30/2019 26 20 - 32 mmol/L Final     Anion Gap   Date Value Ref Range Status   08/30/2019 6 3 - 14 mmol/L Final     Glucose   Date Value Ref Range Status   08/30/2019 80 70 - 99 mg/dL Final     Comment:     Fasting specimen     Urea Nitrogen   Date Value Ref Range Status   08/30/2019 12 7 - 30 mg/dL Final     Creatinine   Date Value Ref Range Status   08/30/2019 0.67 0.52 - 1.04 mg/dL Final     GFR Estimate   Date Value Ref Range Status   08/30/2019 >90 >60 mL/min/[1.73_m2] Final     Comment:     Non  GFR Calc  Starting 12/18/2018, serum creatinine based estimated GFR (eGFR) will be   calculated using the Chronic Kidney Disease Epidemiology Collaboration   (CKD-EPI) equation.       Calcium   Date Value Ref Range Status   08/30/2019 8.8 8.5 - 10.1 mg/dL Final     TSH   Date Value Ref Range Status   02/24/2020 1.72 0.40 - 4.00 mU/L Final "       Impression/Plan:  1. Patient has features and risk factors for possible obstructive sleep apnea including: BMI of 38, snoring, non-refreshing sleep, daytime fatigue, difficulty maintaining sleep. The STOP-BANG score is 4/8.  The pathophysiology, diagnosis and treatment of ANISH was discussed and a handout was provided. Recommend Polysomnogram (using 4% desaturation/Medicare/2012 AASM 1B scoring rules) for intermediate risk obstructive sleep apnea. Ambien if needed.   2. Sleep maintenance insomnia- if no significant sleep apnea, will recommend cognitive behavioral treatment to help consolidate sleep.  3. Restless legs syndrome (RLS),  Infrequent, does not warrant therapy at this time.  4. Recommend weight management.     Literature provided regarding sleep apnea.      She will follow up with me in approximately two weeks after her sleep study has been competed to review the results and discuss plan of care.       Polysomnography reviewed.  Obstructive sleep apnea reviewed.  Complications of untreated sleep apnea were reviewed.    Peterson Barboza PA-C    CC: Kaylynn Cameron

## 2020-04-15 ENCOUNTER — VIRTUAL VISIT (OUTPATIENT)
Dept: SLEEP MEDICINE | Facility: CLINIC | Age: 55
End: 2020-04-15
Attending: PHYSICIAN ASSISTANT
Payer: COMMERCIAL

## 2020-04-15 DIAGNOSIS — E66.812 CLASS 2 OBESITY DUE TO EXCESS CALORIES WITH BODY MASS INDEX (BMI) OF 39.0 TO 39.9 IN ADULT, UNSPECIFIED WHETHER SERIOUS COMORBIDITY PRESENT: ICD-10-CM

## 2020-04-15 DIAGNOSIS — R06.00 DYSPNEA AND RESPIRATORY ABNORMALITY: ICD-10-CM

## 2020-04-15 DIAGNOSIS — E66.09 CLASS 2 OBESITY DUE TO EXCESS CALORIES WITH BODY MASS INDEX (BMI) OF 39.0 TO 39.9 IN ADULT, UNSPECIFIED WHETHER SERIOUS COMORBIDITY PRESENT: ICD-10-CM

## 2020-04-15 DIAGNOSIS — R06.89 DYSPNEA AND RESPIRATORY ABNORMALITY: ICD-10-CM

## 2020-04-15 DIAGNOSIS — Z72.820 LACK OF ADEQUATE SLEEP: ICD-10-CM

## 2020-04-15 DIAGNOSIS — R53.83 FATIGUE, UNSPECIFIED TYPE: ICD-10-CM

## 2020-04-15 PROCEDURE — 99203 OFFICE O/P NEW LOW 30 MIN: CPT | Mod: TEL | Performed by: PHYSICIAN ASSISTANT

## 2020-04-15 RX ORDER — ZOLPIDEM TARTRATE 5 MG/1
TABLET ORAL
Qty: 1 TABLET | Refills: 0 | Status: SHIPPED | OUTPATIENT
Start: 2020-04-15 | End: 2022-06-27

## 2020-07-01 ENCOUNTER — TELEPHONE (OUTPATIENT)
Dept: SLEEP MEDICINE | Facility: CLINIC | Age: 55
End: 2020-07-01

## 2020-07-01 NOTE — TELEPHONE ENCOUNTER
Patient was called to schedule in lab sleep study at [facility] sleep [place].     Voicemail left requesting call back.      .

## 2020-08-19 ENCOUNTER — TRANSFERRED RECORDS (OUTPATIENT)
Dept: HEALTH INFORMATION MANAGEMENT | Facility: CLINIC | Age: 55
End: 2020-08-19

## 2020-09-24 ENCOUNTER — PATIENT OUTREACH (OUTPATIENT)
Dept: FAMILY MEDICINE | Facility: CLINIC | Age: 55
End: 2020-09-24

## 2020-09-24 DIAGNOSIS — R87.612 PAPANICOLAOU SMEAR OF CERVIX WITH LOW GRADE SQUAMOUS INTRAEPITHELIAL LESION (LGSIL): ICD-10-CM

## 2020-11-09 ENCOUNTER — TRANSFERRED RECORDS (OUTPATIENT)
Dept: HEALTH INFORMATION MANAGEMENT | Facility: CLINIC | Age: 55
End: 2020-11-09

## 2020-12-13 ENCOUNTER — HEALTH MAINTENANCE LETTER (OUTPATIENT)
Age: 55
End: 2020-12-13

## 2021-03-03 NOTE — TELEPHONE ENCOUNTER
Kaylynn,    Patient is lost to pap tracking follow-up. Attempts to contact pt have been made per reminder process and there has been no reply and/or no appt scheduled.      Marivel Cummings RN  Pap Tracking

## 2021-05-06 ENCOUNTER — TRANSFERRED RECORDS (OUTPATIENT)
Dept: HEALTH INFORMATION MANAGEMENT | Facility: CLINIC | Age: 56
End: 2021-05-06

## 2021-09-26 ENCOUNTER — HEALTH MAINTENANCE LETTER (OUTPATIENT)
Age: 56
End: 2021-09-26

## 2021-09-27 ENCOUNTER — TRANSFERRED RECORDS (OUTPATIENT)
Dept: HEALTH INFORMATION MANAGEMENT | Facility: CLINIC | Age: 56
End: 2021-09-27

## 2022-01-16 ENCOUNTER — HEALTH MAINTENANCE LETTER (OUTPATIENT)
Age: 57
End: 2022-01-16

## 2022-01-25 ENCOUNTER — TRANSFERRED RECORDS (OUTPATIENT)
Dept: HEALTH INFORMATION MANAGEMENT | Facility: CLINIC | Age: 57
End: 2022-01-25
Payer: COMMERCIAL

## 2022-02-15 ENCOUNTER — TRANSFERRED RECORDS (OUTPATIENT)
Dept: HEALTH INFORMATION MANAGEMENT | Facility: CLINIC | Age: 57
End: 2022-02-15
Payer: COMMERCIAL

## 2022-06-27 ENCOUNTER — E-VISIT (OUTPATIENT)
Dept: URGENT CARE | Facility: CLINIC | Age: 57
End: 2022-06-27
Payer: COMMERCIAL

## 2022-06-27 DIAGNOSIS — R05.9 COUGH: Primary | ICD-10-CM

## 2022-06-27 PROCEDURE — 99207 PR NON-BILLABLE SERV PER CHARTING: CPT

## 2022-06-28 NOTE — PATIENT INSTRUCTIONS
Dear Yue Mays,    We are sorry you are not feeling well. Based on the responses you provided, it is recommended that you be seen in-person in urgent care so we can better evaluate your symptoms. Please click here to find the nearest urgent care location to you.   You will not be charged for this Visit. Thank you for trusting us with your care.    Argentina Ortiz, CNP

## 2022-07-02 ENCOUNTER — OFFICE VISIT (OUTPATIENT)
Dept: URGENT CARE | Facility: URGENT CARE | Age: 57
End: 2022-07-02
Payer: COMMERCIAL

## 2022-07-02 VITALS
RESPIRATION RATE: 18 BRPM | OXYGEN SATURATION: 96 % | WEIGHT: 231 LBS | TEMPERATURE: 98.9 F | DIASTOLIC BLOOD PRESSURE: 83 MMHG | HEART RATE: 89 BPM | BODY MASS INDEX: 40.92 KG/M2 | SYSTOLIC BLOOD PRESSURE: 147 MMHG

## 2022-07-02 DIAGNOSIS — R03.0 ELEVATED BP WITHOUT DIAGNOSIS OF HYPERTENSION: ICD-10-CM

## 2022-07-02 DIAGNOSIS — R05.3 CHRONIC COUGH: Primary | ICD-10-CM

## 2022-07-02 PROCEDURE — 99214 OFFICE O/P EST MOD 30 MIN: CPT | Performed by: INTERNAL MEDICINE

## 2022-07-02 PROCEDURE — 93000 ELECTROCARDIOGRAM COMPLETE: CPT | Performed by: INTERNAL MEDICINE

## 2022-07-02 RX ORDER — ALBUTEROL SULFATE 90 UG/1
2 AEROSOL, METERED RESPIRATORY (INHALATION) EVERY 4 HOURS PRN
Qty: 18 G | Refills: 0 | Status: SHIPPED | OUTPATIENT
Start: 2022-07-02 | End: 2022-07-29

## 2022-07-02 RX ORDER — FLUTICASONE PROPIONATE 220 UG/1
2 AEROSOL, METERED RESPIRATORY (INHALATION) 2 TIMES DAILY
Qty: 12 G | Refills: 0 | Status: SHIPPED | OUTPATIENT
Start: 2022-07-02 | End: 2022-07-29

## 2022-07-02 NOTE — PROGRESS NOTES
SUBJECTIVE:  Yue Mays is an 57 year old female who presents for cough.  Has chronic cough for a few years, but has worsened this year for the past few months.  In past has been on an inhaler she used prn but is not on it now.  Cough is present more during day, but sleeps with a cough drop in mouth as does cough some at night. Activity not affect cough.  Temperature or weather not seem to affect cough.  No heartburn or acidic taste in mouth. Cough not change with eating.  Not feel like food gets stuck when swallows.  No n/v/d.  No fevers.  Cough is not productive.  No nasal congestion or post-nasal drip.  Sometimes nose is stuffy when first gets up but then clears quickly for the day.  No eye itching or watering.  No hx of allergies.    PMH:   has a past medical history of Abnormal Pap smear of cervix (1/2015, 2016, 2017, 8/29/19), Cervical high risk HPV (human papillomavirus) test positive (2015, 06/16/2016, 2017, 8/29/19), H/O colposcopy with cervical biopsy (2/2015), History of colposcopy (7/11/16), Infected sebaceous cyst (4/27/2018), Mild dysplasia of cervix (PAVAN I) (2019), and Urinary incontinence.  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     Cystocele, midline     Urgency of urination     Female stress incontinence     Papanicolaou smear of cervix with low grade squamous intraepithelial lesion (LGSIL)     Depression with anxiety     Cough     BMI 40.0-44.9, adult (H)     Fatigue, unspecified type     Memory changes     Social History     Socioeconomic History     Marital status:      Spouse name: None     Number of children: None     Years of education: None     Highest education level: None   Tobacco Use     Smoking status: Never Smoker     Smokeless tobacco: Never Used   Substance and Sexual Activity     Alcohol use: Not Currently     Alcohol/week: 0.0 standard drinks      Comment: minimal - maybe once a month     Drug use: No     Sexual activity: Yes     Partners: Male     Birth control/protection: Condom, Male Surgical   Other Topics Concern     Parent/sibling w/ CABG, MI or angioplasty before 65F 55M? No   FH is negative for allergies, asthma, and eczema  Family History   Problem Relation Age of Onset     Cancer Mother         hodgkins     Breast Cancer Mother         early 30s      Other Cancer Mother         Hodgkin's Disease, Breast Cancer     Arthritis Father      Diabetes Father         I don't recall if he had diabetes     Hypertension Father      Depression/Anxiety Father         undiagnosed     Depression/Anxiety Sister         undiagnosed     Depression Daughter      Anxiety Disorder Daughter      Depression Daughter      Anxiety Disorder Daughter        ALLERGIES:  Nkda [no known drug allergies]    Current Outpatient Medications   Medication     albuterol (PROAIR HFA/PROVENTIL HFA/VENTOLIN HFA) 108 (90 Base) MCG/ACT inhaler     buPROPion (WELLBUTRIN) 75 MG tablet     FLUoxetine (PROZAC) 20 MG capsule     FLUoxetine (PROZAC) 40 MG capsule     fluticasone (FLOVENT HFA) 220 MCG/ACT inhaler     No current facility-administered medications for this visit.         ROS:  ROS is done and is negative for general/constitutional, eye, ENT, Respiratory, cardiovascular, GI, , Skin, musculoskeletal except as noted elsewhere.  All other review of systems negative except as noted elsewhere.      OBJECTIVE:  BP (!) 147/83 (BP Location: Right arm, Patient Position: Sitting, Cuff Size: Adult Large)   Pulse 89   Temp 98.9  F (37.2  C) (Tympanic)   Resp 18   Wt 104.8 kg (231 lb)   SpO2 96%   BMI 40.92 kg/m    GENERAL APPEARANCE: Alert, in no acute distress  EYES: normal  EARS: External ears normal. Canals clear. TM's normal.  NOSE:mild clear drainage present  OROPHARYNX:normal  NECK:No adenopathy,masses or thyromegaly  RESP: no crackles, clear to auscultation, except minimal wheeze  diffusely and end of forced expiration.  CV:regular rate and rhythm and no murmurs, clicks, or gallops  ABDOMEN: Abdomen soft, non-tender. BS normal. No masses, organomegaly  SKIN: no ulcers, lesions or rash  MUSCULOSKELETAL:Musculoskeletal normal      RESULTS  cxr - no masses or infiltrate noted on my reading..  Recent Results (from the past 48 hour(s))   XR Chest 2 Views    Narrative    EXAM: XR CHEST 2 VW  [facility]  DATE/TIME: 7/2/2022 5:30 PM    INDICATION:  Cough  COMPARISON: None.      Impression    IMPRESSION:   No acute abnormality.    No focal pulmonary infiltrate, pleural effusion, or pneumothorax. The cardiac size and mediastinal contours appear within normal limits. Mild degenerative change involving the spine.     EKG: nsr with no significant st or t wave changes, no prior for comparison    ASSESSMENT/PLAN:    ASSESSMENT / PLAN:  (R05.3) Chronic cough  (primary encounter diagnosis)  Comment: possible asthma as cause of her chronic cough.  Consider gerd but does not have any sxs.  Consider post-nasal drainage but not report much for sxs there or have any throat irritation routinely.  Has benefited from albuterol inhaler in past, so will rx prn now.  will also do 1 month of flovent inhaler bid to see if sxs improve.  Plan: XR Chest 2 Views, EKG 12-lead complete w/read -        Clinics, fluticasone (FLOVENT HFA) 220 MCG/ACT         inhaler, albuterol (PROAIR HFA/PROVENTIL         HFA/VENTOLIN HFA) 108 (90 Base) MCG/ACT inhaler        Pt to use flovent twice a day, and albuterol prn over the next 4 weeks.  F/u with pcp in 4 weeks.  If no improvement consider evaluation for other etiologies such as gerd or post-nasal drainage.  Consider testing for asthma. Reviewed medication instructions and side effects. Follow up if experiences side effects..  I reviewed supportive care, otc meds to use if needed, expected course, and signs of concern.  Follow up with pcp regardless of  improvement or not in 4 week(s) or sooner if worsens in any way.  Reviewed red flag symptoms and is to go to the ER if experiences any of these.    (R03.0) Elevated BP without diagnosis of hypertension  Comment: may be due to her concerns and sxs  Plan: f/u with pcp      PPE worn: mask and shield.    See [facility] for orders, medications, letters, patient instructions    Kaylynn Zhao M.D.

## 2022-07-05 ENCOUNTER — TRANSFERRED RECORDS (OUTPATIENT)
Dept: FAMILY MEDICINE | Facility: CLINIC | Age: 57
End: 2022-07-05

## 2022-07-19 NOTE — PROGRESS NOTES
Assessment & Plan     Chronic cough  Improved but not to goal  Concern for allergic component given history will add singulair  Consider advair in future or something with a LABA as well  Needs asthma testing, we are not doing spirometry here yet so will refer to allergist for this  Continue flovent  Add back antihistamine in am  To er wtih severe  sob  F/u 1 week with message if noticing no change in symtpoms, sooner if things worsen    - montelukast (SINGULAIR) 10 MG tablet; Take 1 tablet (10 mg) by mouth At Bedtime  - fluticasone (FLOVENT HFA) 220 MCG/ACT inhaler; Inhale 2 puffs into the lungs 2 times daily  - albuterol (PROAIR HFA/PROVENTIL HFA/VENTOLIN HFA) 108 (90 Base) MCG/ACT inhaler; Inhale 2 puffs into the lungs every 4 hours as needed for shortness of breath / dyspnea, wheezing or other (cough)  - Adult Allergy/Asthma Referral; Future    Billin min spent on patient today including chart review, history, exam, and explaining treatment plan and follow-up.       Return in about 1 week (around 2022) for Physical Exam, pap.    Kaylynn Cameron PA-C  [facility] MATTIE Turner is a 57 year old accompanied by her Self, presenting for the following health issues:  Urgent Care    PAP and MAMMO is due, pt will call.    History of Present Illness       Reason for visit:  Follow up on persistent cough    She eats 2-3 servings of fruits and vegetables daily.She consumes 0 sweetened beverage(s) daily.She exercises with enough effort to increase her heart rate 9 or less minutes per day.  She exercises with enough effort to increase her heart rate 3 or less days per week.   She is taking medications regularly.       ED/UC Followup:    Facility:   [facility]   Date of visit: 2022  Reason for visit: Asthma flare up?  Current Status: SX not improving per pt, feels the same.  Patient was seen almost a month ago in UC. Is here due to no improvement. She had a negative chest  xray at that visit. Had mild expiratory wheeze at that exam. See note below for history and their plan.   Oxygen is 97 percent. bmi 41.       She thinks meds helped for the first two weeks. Overall cough is a bit better. Albuterol does help when she uses it. Not much sob at all per patient. Dry cough. On flovent high dose already. Chest pain got better when cough got better. Chest pain overall is a lot better.     Stopped taking an antihistamine a little bit ago.     Has not had asthma testing before. Feels she gets this cough at the same time every year/seasonal.   Had neg ekg done in .           Copied note from :  Yue Mays is an 57 year old female who presents for cough.  Has chronic cough for a few years, but has worsened this year for the past few months.  In past has been on an inhaler she used prn but is not on it now.  Cough is present more during day, but sleeps with a cough drop in mouth as does cough some at night. Activity not affect cough.  Temperature or weather not seem to affect cough.  No heartburn or acidic taste in mouth. Cough not change with eating.  Not feel like food gets stuck when swallows.  No n/v/d.  No fevers.  Cough is not productive.  No nasal congestion or post-nasal drip.  Sometimes nose is stuffy when first gets up but then clears quickly for the day.  No eye itching or watering.  No hx of allergies.  ASSESSMENT / PLAN:  (R05.3) Chronic cough  (primary encounter diagnosis)  Comment: possible asthma as cause of her chronic cough.  Consider gerd but does not have any sxs.  Consider post-nasal drainage but not report much for sxs there or have any throat irritation routinely.  Has benefited from albuterol inhaler in past, so will rx prn now.  will also do 1 month of flovent inhaler bid to see if sxs improve.  Plan: XR Chest 2 Views, EKG 12-lead complete w/read -        Clinics, fluticasone (FLOVENT HFA) 220 MCG/ACT         inhaler, albuterol (PROAIR HFA/PROVENTIL          "HFA/VENTOLIN HFA) 108 (90 Base) MCG/ACT inhaler        Pt to use flovent twice a day, and albuterol prn over the next 4 weeks.  F/u with pcp in 4 weeks.  If no improvement consider evaluation for other etiologies such as gerd or post-nasal drainage.  Consider testing for asthma. Reviewed medication instructions and side effects. Follow up if experiences side effects..  I reviewed supportive care, otc meds to use if needed, expected course, and signs of concern.  Follow up with pcp regardless of improvement or not in 4 week(s) or sooner if worsens in any way.  Reviewed red flag symptoms and is to go to the ER if experiences any of these.  Kaylynn Zhao M.D.      Review of Systems   Constitutional, HEENT, cardiovascular, pulmonary, GI, , musculoskeletal, neuro, skin, endocrine and psych systems are negative, except as otherwise noted.      Objective    /83   Pulse 72   Temp 97.4  F (36.3  C) (Tympanic)   Resp 16   Ht 1.6 m (5' 3\")   Wt 105.7 kg (233 lb)   SpO2 97%   Breastfeeding No   BMI 41.27 kg/m    Body mass index is 41.27 kg/m .  Physical Exam   GENERAL:  No acute distress.  Interacts appropriately.  Breathing without difficulty.  Alert.      CARDIAC:   Regular rate and rhythm.  No murmurs, rubs, or gallops.   PULMONARY: Clear to auscultation bilaterally.  No  wheezes, crackles, or rhonchi.  Normal air exchange/breath sounds.  No use of accessory muscles.    PSYCH: Normal affect.  SKIN: No rashes.                .  ..    "

## 2022-07-29 ENCOUNTER — OFFICE VISIT (OUTPATIENT)
Dept: FAMILY MEDICINE | Facility: CLINIC | Age: 57
End: 2022-07-29
Payer: COMMERCIAL

## 2022-07-29 VITALS
WEIGHT: 233 LBS | BODY MASS INDEX: 41.29 KG/M2 | OXYGEN SATURATION: 97 % | TEMPERATURE: 97.4 F | SYSTOLIC BLOOD PRESSURE: 131 MMHG | HEIGHT: 63 IN | RESPIRATION RATE: 16 BRPM | HEART RATE: 72 BPM | DIASTOLIC BLOOD PRESSURE: 83 MMHG

## 2022-07-29 DIAGNOSIS — R05.3 CHRONIC COUGH: ICD-10-CM

## 2022-07-29 PROBLEM — Z71.89 ADVANCED DIRECTIVES, COUNSELING/DISCUSSION: Status: ACTIVE | Noted: 2022-07-29

## 2022-07-29 PROCEDURE — 99214 OFFICE O/P EST MOD 30 MIN: CPT | Performed by: PHYSICIAN ASSISTANT

## 2022-07-29 RX ORDER — FLUTICASONE PROPIONATE 220 UG/1
2 AEROSOL, METERED RESPIRATORY (INHALATION) 2 TIMES DAILY
Qty: 12 G | Refills: 0 | Status: SHIPPED | OUTPATIENT
Start: 2022-07-29 | End: 2022-08-25

## 2022-07-29 RX ORDER — ALBUTEROL SULFATE 90 UG/1
2 AEROSOL, METERED RESPIRATORY (INHALATION) EVERY 4 HOURS PRN
Qty: 18 G | Refills: 0 | Status: SHIPPED | OUTPATIENT
Start: 2022-07-29 | End: 2022-11-07

## 2022-07-29 RX ORDER — MONTELUKAST SODIUM 10 MG/1
10 TABLET ORAL AT BEDTIME
Qty: 30 TABLET | Refills: 11 | Status: SHIPPED | OUTPATIENT
Start: 2022-07-29 | End: 2022-08-22

## 2022-08-20 DIAGNOSIS — R05.3 CHRONIC COUGH: ICD-10-CM

## 2022-08-22 RX ORDER — MONTELUKAST SODIUM 10 MG/1
TABLET ORAL
Qty: 90 TABLET | Refills: 3 | Status: SHIPPED | OUTPATIENT
Start: 2022-08-22

## 2022-08-25 DIAGNOSIS — R05.3 CHRONIC COUGH: ICD-10-CM

## 2022-08-25 RX ORDER — FLUTICASONE PROPIONATE 220 UG/1
AEROSOL, METERED RESPIRATORY (INHALATION)
Qty: 12 G | Refills: 1 | Status: SHIPPED | OUTPATIENT
Start: 2022-08-25

## 2022-10-14 ENCOUNTER — PATIENT OUTREACH (OUTPATIENT)
Dept: FAMILY MEDICINE | Facility: CLINIC | Age: 57
End: 2022-10-14

## 2022-10-14 NOTE — TELEPHONE ENCOUNTER
Patient Quality Outreach    Patient is due for the following:   Breast Cancer Screening - Mammogram    Next Steps:   Schedule a office visit for Mammogram    Type of outreach:    Sent A-TEX message.    Next Steps:  Reach out within 90 days via A-TEX.    Max number of attempts reached: Yes. Will try again in 90 days if patient still on fail list.    Questions for provider review:    None     Michelle Martinez MA

## 2022-12-11 ENCOUNTER — OFFICE VISIT (OUTPATIENT)
Dept: URGENT CARE | Facility: URGENT CARE | Age: 57
End: 2022-12-11
Payer: COMMERCIAL

## 2022-12-11 VITALS
OXYGEN SATURATION: 97 % | HEART RATE: 84 BPM | SYSTOLIC BLOOD PRESSURE: 153 MMHG | DIASTOLIC BLOOD PRESSURE: 92 MMHG | TEMPERATURE: 98.1 F | RESPIRATION RATE: 20 BRPM

## 2022-12-11 DIAGNOSIS — H65.193 OTHER NON-RECURRENT ACUTE NONSUPPURATIVE OTITIS MEDIA OF BOTH EARS: ICD-10-CM

## 2022-12-11 DIAGNOSIS — J06.9 UPPER RESPIRATORY TRACT INFECTION, UNSPECIFIED TYPE: Primary | ICD-10-CM

## 2022-12-11 LAB
DEPRECATED S PYO AG THROAT QL EIA: NEGATIVE
FLUAV AG SPEC QL IA: NEGATIVE
FLUBV AG SPEC QL IA: NEGATIVE

## 2022-12-11 PROCEDURE — U0003 INFECTIOUS AGENT DETECTION BY NUCLEIC ACID (DNA OR RNA); SEVERE ACUTE RESPIRATORY SYNDROME CORONAVIRUS 2 (SARS-COV-2) (CORONAVIRUS DISEASE [COVID-19]), AMPLIFIED PROBE TECHNIQUE, MAKING USE OF HIGH THROUGHPUT TECHNOLOGIES AS DESCRIBED BY CMS-2020-01-R: HCPCS | Performed by: EMERGENCY MEDICINE

## 2022-12-11 PROCEDURE — 87804 INFLUENZA ASSAY W/OPTIC: CPT | Performed by: EMERGENCY MEDICINE

## 2022-12-11 PROCEDURE — 99214 OFFICE O/P EST MOD 30 MIN: CPT | Performed by: EMERGENCY MEDICINE

## 2022-12-11 PROCEDURE — U0005 INFEC AGEN DETEC AMPLI PROBE: HCPCS | Performed by: EMERGENCY MEDICINE

## 2022-12-11 PROCEDURE — 87651 STREP A DNA AMP PROBE: CPT | Performed by: EMERGENCY MEDICINE

## 2022-12-11 NOTE — PROGRESS NOTES
Assessment & Plan     Diagnosis:    (J06.9) Upper respiratory tract infection, unspecified type  (primary encounter diagnosis)    (H65.193) Other non-recurrent acute nonsuppurative otitis media of both ears  Plan: amoxicillin-clavulanate (AUGMENTIN) 875-125 MG         tablet      Medical Decision Making:  Yue Mays is a 57 year old female presents to clinic for concern for ear infection. Associated symptoms include cough, sinus pressure/pain and body aches. The patient has an exam consistent with acute otitis media.  Patient has a concurrent URI but no fever. This is likely viral but cannot rule out bacterial. Labs for influenza A/B and the rapid strep test are negative; COVID-19 PCR is pending at this time.  Will therefore do watchful waiting antibiotics while we ensure good pain control.  There is no sign of mastoiditis, meningitis, perforation, mass, dental abscess, or peritonsillar abscess. There is no evidence of otitis externa.  The patient will be started on antibiotics in 24-48 hours if fever, chills or increased pain develop and may take Tylenol or Ibuprofen for pain.  Return if increasing pain, fever, decrease in hearing or ear discharge that persists.  Follow-up with primary physician in 7-10 days, if symptoms persist. The patient voices understanding and agreement with the plan.     Fredis Cameron PA-C  [facility] U[facility]    Subjective     Yue Mays is a 57 year old female who presents to clinic today for the following health issues:  Chief Complaint   Patient presents with     Urgent Care     Throat Problem     Per patient symptoms started five days throat pain , left ear discomfort, and dry cough. Patient has been taking musinex and sudafed        HPI    Onset of symptoms was 5 day(s) ago.  Course of illness is worsening.    Severity moderate  Current and Associated symptoms: fever, runny nose, stuffy nose, cough - non-productive, ear pain om the left getting worse, sore throat,  facial pain/pressure, headache and body aches  Denies wheezing, shortness of breath, ear drainage, vomiting, diarrhea and taking in fluids?  Yes -- eating/drinking fine.  Treatment measures tried include Tylenol/Ibuprofen, Decongestants and OTC Cough med  Predisposing factors include None  History of PE tubes? No    There is no loss of hearing, drainage from the ear, difficulties breathing or swallowing.  No recent antibiotics.      Review of Systems    See HPI    Objective      Vitals: BP (!) 153/92   Pulse 84   Temp 98.1  F (36.7  C) (Tympanic)   Resp 20   SpO2 97%     Patient Vitals for the past 24 hrs:   BP Temp Temp src Pulse Resp SpO2   12/11/22 1324 (!) 153/92 98.1  F (36.7  C) Tympanic 84 20 97 %       Vital signs reviewed by: Fredis Cameron PA-C    Physical Exam   Constitutional: Alert and active. With caregiver; in no acute distress.  HENT: Ears: Right TM is erythematous; non-bulging. Left TM is erythematous and bulging. No perforation. Bilateral external ear canals and auricles are normal. No tenderness with manipulation of the pinnae and tragus. No mastoid tenderness bilaterally.  Nose: Nose normal.    Mouth: Normal tongue and tonsil. Posterior oropharynx is clear. Uvula is midline.  Cardiovascular: Regular rate and rhythm  Pulmonary/Chest: Effort normal. No respiratory distress. Lungs clear to auscultation bilaterally.  Skin: No rash noted on visualized skin or face.      Labs/Imaging:  Results for orders placed or performed in visit on 12/11/22   Streptococcus A Rapid Screen w/Reflex to PCR - Clinic Collect     Status: Normal    Specimen: Throat; Swab   Result Value Ref Range    Group A Strep antigen Negative Negative   Influenza A & B Antigen - Clinic Collect     Status: Normal    Specimen: Nose; Swab   Result Value Ref Range    Influenza A antigen Negative Negative    Influenza B antigen Negative Negative    Narrative    Test results must be correlated with clinical data. If necessary, results  should be confirmed by a molecular assay or viral culture.       Fredis Cameron PA-C, December 11, 2022

## 2022-12-12 LAB
GROUP A STREP BY PCR: NOT DETECTED
SARS-COV-2 RNA RESP QL NAA+PROBE: NEGATIVE

## 2023-04-23 ENCOUNTER — HEALTH MAINTENANCE LETTER (OUTPATIENT)
Age: 58
End: 2023-04-23

## 2024-01-18 ENCOUNTER — OFFICE VISIT (OUTPATIENT)
Dept: FAMILY MEDICINE | Facility: CLINIC | Age: 59
End: 2024-01-18
Payer: COMMERCIAL

## 2024-01-18 VITALS
TEMPERATURE: 97.9 F | BODY MASS INDEX: 37.46 KG/M2 | OXYGEN SATURATION: 96 % | SYSTOLIC BLOOD PRESSURE: 115 MMHG | HEIGHT: 64 IN | DIASTOLIC BLOOD PRESSURE: 75 MMHG | HEART RATE: 96 BPM | WEIGHT: 219.4 LBS | RESPIRATION RATE: 12 BRPM

## 2024-01-18 DIAGNOSIS — Z13.1 SCREENING FOR DIABETES MELLITUS: ICD-10-CM

## 2024-01-18 DIAGNOSIS — Z12.4 SCREENING FOR MALIGNANT NEOPLASM OF CERVIX: ICD-10-CM

## 2024-01-18 DIAGNOSIS — Z12.31 ENCOUNTER FOR SCREENING MAMMOGRAM FOR BREAST CANCER: ICD-10-CM

## 2024-01-18 DIAGNOSIS — Z00.00 ROUTINE GENERAL MEDICAL EXAMINATION AT A HEALTH CARE FACILITY: Primary | ICD-10-CM

## 2024-01-18 DIAGNOSIS — H60.543 ECZEMA OF EXTERNAL EAR, BILATERAL: ICD-10-CM

## 2024-01-18 DIAGNOSIS — Z13.6 CARDIOVASCULAR SCREENING; LDL GOAL LESS THAN 160: ICD-10-CM

## 2024-01-18 LAB — HBA1C MFR BLD: 5.4 % (ref 0–5.6)

## 2024-01-18 PROCEDURE — 87624 HPV HI-RISK TYP POOLED RSLT: CPT | Performed by: NURSE PRACTITIONER

## 2024-01-18 PROCEDURE — 36415 COLL VENOUS BLD VENIPUNCTURE: CPT | Performed by: NURSE PRACTITIONER

## 2024-01-18 PROCEDURE — 99396 PREV VISIT EST AGE 40-64: CPT | Mod: 25 | Performed by: NURSE PRACTITIONER

## 2024-01-18 PROCEDURE — G0124 SCREEN C/V THIN LAYER BY MD: HCPCS | Performed by: PATHOLOGY

## 2024-01-18 PROCEDURE — 80048 BASIC METABOLIC PNL TOTAL CA: CPT | Performed by: NURSE PRACTITIONER

## 2024-01-18 PROCEDURE — 80061 LIPID PANEL: CPT | Performed by: NURSE PRACTITIONER

## 2024-01-18 PROCEDURE — 91320 SARSCV2 VAC 30MCG TRS-SUC IM: CPT | Performed by: NURSE PRACTITIONER

## 2024-01-18 PROCEDURE — 90480 ADMN SARSCOV2 VAC 1/ONLY CMP: CPT | Performed by: NURSE PRACTITIONER

## 2024-01-18 PROCEDURE — 99213 OFFICE O/P EST LOW 20 MIN: CPT | Mod: 25 | Performed by: NURSE PRACTITIONER

## 2024-01-18 PROCEDURE — 83036 HEMOGLOBIN GLYCOSYLATED A1C: CPT | Performed by: NURSE PRACTITIONER

## 2024-01-18 PROCEDURE — 90471 IMMUNIZATION ADMIN: CPT | Performed by: NURSE PRACTITIONER

## 2024-01-18 PROCEDURE — 90686 IIV4 VACC NO PRSV 0.5 ML IM: CPT | Performed by: NURSE PRACTITIONER

## 2024-01-18 PROCEDURE — G0145 SCR C/V CYTO,THINLAYER,RESCR: HCPCS | Performed by: NURSE PRACTITIONER

## 2024-01-18 RX ORDER — DIAPER,BRIEF,INFANT-TODD,DISP
EACH MISCELLANEOUS 2 TIMES DAILY
COMMUNITY
Start: 2024-01-18 | End: 2024-03-18

## 2024-01-18 ASSESSMENT — ENCOUNTER SYMPTOMS
FEVER: 0
SHORTNESS OF BREATH: 0
HEADACHES: 0
HEARTBURN: 0
ABDOMINAL PAIN: 0
MYALGIAS: 0
NERVOUS/ANXIOUS: 0
NAUSEA: 0
PARESTHESIAS: 0
DIZZINESS: 0
SORE THROAT: 0
BREAST MASS: 0
JOINT SWELLING: 0
ARTHRALGIAS: 0
DIARRHEA: 0
FREQUENCY: 0
COUGH: 1
CHILLS: 0
DYSURIA: 0
HEMATOCHEZIA: 0
HEMATURIA: 0
WEAKNESS: 0
EYE PAIN: 0
PALPITATIONS: 0
CONSTIPATION: 0

## 2024-01-18 ASSESSMENT — PAIN SCALES - GENERAL: PAINLEVEL: NO PAIN (0)

## 2024-01-18 NOTE — PROGRESS NOTES
Preventive Care Visit  [facility]  IRWIN Oliveros CNP, Family Medicine  Jan 18, 2024       SUBJECTIVE:   Yue is a 58 year old, presenting for the following:  Physical        1/18/2024     8:14 AM   Additional Questions   Roomed by Lety KEE   Accompanied by self     Patient here for yearly preventative care visit. In addition, they have the following concerns:    1. Dry, crusty, occasionally itchy ears. Tried putting facial cream/hand lotion which wasn't helpful. Feels more moist at times. Uses q-tips. No changes in hearing.       Healthy Habits:     Getting at least 3 servings of Calcium per day:  Yes    Bi-annual eye exam:  Yes    Dental care twice a year:  Yes    Sleep apnea or symptoms of sleep apnea:  None    Diet:  Other    Frequency of exercise:  None    Taking medications regularly:  Yes    Medication side effects:  None    Additional concerns today:  Yes    Ears- dry and crusty inside    Today's PHQ-2 Score:       1/18/2024     8:07 AM   PHQ-2 ( 1999 Pf[employer])   Q1: Little interest or pleasure in doing things 0   Q2: Feeling down, depressed or hopeless 0   PHQ-2 Score 0   Q1: Little interest or pleasure in doing things Not at all   Q2: Feeling down, depressed or hopeless Not at all   PHQ-2 Score 0     Social History     Tobacco Use    Smoking status: Never    Smokeless tobacco: Never   Substance Use Topics    Alcohol use: Not Currently     Alcohol/week: 0.0 standard drinks of alcohol     Comment: minimal - maybe once a month             1/18/2024     8:07 AM   Alcohol Use   Prescreen: >3 drinks/day or >7 drinks/week? Not Applicable     Reviewed orders with patient.  Reviewed health maintenance and updated orders accordingly - Yes  Lab work is in process  Labs reviewed in EPIC    Breast Cancer Screening:    FHS-7:       1/18/2024     8:09 AM   Breast CA Risk Assessment (FHS-7)   Did any of your first-degree relatives have breast or ovarian cancer? Yes   Did any of your relatives  "have bilateral breast cancer? Unknown   Did any man in your family have breast cancer? No   Did any woman in your family have breast and ovarian cancer? Yes   Did any woman in your family have breast cancer before age 50 y? Yes   Do you have 2 or more relatives with breast and/or ovarian cancer? Unknown   Do you have 2 or more relatives with breast and/or bowel cancer? Unknown     Mom had breast cancer and Hodgkins disease.  Not interested in meeting with genetic counselor.   Mammogram Screening: Recommended mammography every 1-2 years with patient discussion and risk factor consideration  Pertinent mammograms are reviewed under the imaging tab.    History of abnormal Pap smear: NO - age 30-65 PAP every 5 years with negative HPV co-testing recommended      Latest Ref Rng & Units 8/29/2019     2:00 PM 8/29/2019     1:37 PM 6/20/2018    11:16 AM   PAP / HPV   PAP (Historical)   LSIL  LSIL    HPV 16 DNA NEG^Negative Negative      HPV 18 DNA NEG^Negative Negative      Other HR HPV NEG^Negative Positive        Reviewed and updated as needed this visit by clinical staff   Tobacco  Allergies  Meds              Reviewed and updated as needed this visit by Provider                      OBJECTIVE:   /75   Pulse 96   Temp 97.9  F (36.6  C) (Tympanic)   Resp 12   Ht 1.613 m (5' 3.5\")   Wt 99.5 kg (219 lb 6.4 oz)   LMP  (LMP Unknown)   SpO2 96%   BMI 38.26 kg/m     Estimated body mass index is 38.26 kg/m  as calculated from the following:    Height as of this encounter: 1.613 m (5' 3.5\").    Weight as of this encounter: 99.5 kg (219 lb 6.4 oz).  Review of Systems   Constitutional:  Negative for chills and fever.   HENT:  Negative for congestion, ear pain, hearing loss and sore throat.    Eyes:  Negative for pain and visual disturbance.   Respiratory:  Positive for cough. Negative for shortness of breath.    Cardiovascular:  Negative for chest pain and palpitations.   Gastrointestinal:  Negative for abdominal " pain, constipation, diarrhea and nausea.   Genitourinary:  Negative for dysuria, frequency, genital sores, hematuria, pelvic pain, urgency, vaginal bleeding and vaginal discharge.   Musculoskeletal:  Negative for arthralgias, joint swelling and myalgias.   Skin:  Negative for rash.   Neurological:  Negative for dizziness, weakness and headaches.   Psychiatric/Behavioral:  The patient is not nervous/anxious.        Physical Exam  Exam conducted with a chaperone present.   Constitutional:       Appearance: Normal appearance. She is not ill-appearing.   HENT:      Right Ear: Tympanic membrane, ear canal and external ear normal.      Left Ear: Tympanic membrane, ear canal and external ear normal.      Ears:      Comments: Some flaking of skin around ear canal opening of both ears     Nose: Nose normal. No congestion.      Mouth/Throat:      Mouth: Mucous membranes are moist.      Pharynx: Oropharynx is clear.   Eyes:      Pupils: Pupils are equal, round, and reactive to light.   Cardiovascular:      Rate and Rhythm: Normal rate and regular rhythm.      Pulses: Normal pulses.      Heart sounds: Normal heart sounds. No murmur heard.     No friction rub. No gallop.   Pulmonary:      Effort: Pulmonary effort is normal.      Breath sounds: Normal breath sounds.   Abdominal:      General: Abdomen is flat. Bowel sounds are normal.      Palpations: Abdomen is soft.   Genitourinary:     General: Normal vulva.      Labia:         Right: No rash, tenderness, lesion or injury.         Left: No rash, tenderness, lesion or injury.       Vagina: Normal. No vaginal discharge.      Cervix: Normal.   Musculoskeletal:         General: Normal range of motion.      Cervical back: Normal range of motion.   Lymphadenopathy:      Cervical: No cervical adenopathy.   Skin:     General: Skin is warm and dry.   Neurological:      General: No focal deficit present.      Mental Status: She is alert and oriented to person, place, and time.    Psychiatric:         Mood and Affect: Mood normal.         Behavior: Behavior normal.         Thought Content: Thought content normal.         Judgment: Judgment normal.           Diagnostic Test Results:  Labs reviewed in Epic    ASSESSMENT/PLAN:   Routine general medical examination at a health care facility    - REVIEW OF HEALTH MAINTENANCE PROTOCOL ORDERS  - COVID-19 12+ (2023-24) (PFIZER)  - PRIMARY CARE FOLLOW-UP SCHEDULING; Future  - INFLUENZA VACCINE >6 MONTHS (AFLURIA/FLUZONE)    CARDIOVASCULAR SCREENING; LDL GOAL LESS THAN 160    - Lipid panel reflex to direct LDL Non-fasting    Screening for diabetes mellitus  -Sister recently diagnosed  - Hemoglobin A1c; Future  - Basic metabolic panel; Future      Screening for malignant neoplasm of cervix    - Pap Screen with HPV - recommended age 30 - 65 years    Encounter for screening mammogram for breast cancer    - MA SCREENING DIGITAL BILAT - Future  (s+30); Future    Eczema of external ear, bilateral  -Stop using q-tips as that causes increased irritation which worsens itching, swelling, and crusting, as well as increases risk of infection. Can apply hydrocortisone cream or ointment to external ears as needed for itching. If itching deep inside ear canal, can put in drop of baby oil or olive oil in ear to moisturize the skin inside the ear canal.   - hydrocortisone (CORTAID) 1 % external ointment; Apply topically 2 times daily          Counseling  Reviewed preventive health counseling, as reflected in patient instructions       Regular exercise       Healthy diet/nutrition       Osteoporosis prevention/bone health        She reports that she has never smoked. She has never used smokeless tobacco.          Signed Electronically by: IRWIN Oliveros CNP  Answers submitted by the patient for this visit:  Annual Preventive Visit (Submitted on 1/18/2024)  Chief Complaint: Annual Exam:  Blood in stool: No  heartburn: No  peripheral edema: No  mood changes:  No  Skin sensation changes: No  tenderness: No  breast mass: No  breast discharge: No

## 2024-01-19 LAB
ANION GAP SERPL CALCULATED.3IONS-SCNC: 8 MMOL/L (ref 7–15)
BUN SERPL-MCNC: 20.8 MG/DL (ref 6–20)
CALCIUM SERPL-MCNC: 9.9 MG/DL (ref 8.6–10)
CHLORIDE SERPL-SCNC: 104 MMOL/L (ref 98–107)
CHOLEST SERPL-MCNC: 214 MG/DL
CREAT SERPL-MCNC: 0.8 MG/DL (ref 0.51–0.95)
DEPRECATED HCO3 PLAS-SCNC: 30 MMOL/L (ref 22–29)
EGFRCR SERPLBLD CKD-EPI 2021: 85 ML/MIN/1.73M2
FASTING STATUS PATIENT QL REPORTED: NO
GLUCOSE SERPL-MCNC: 77 MG/DL (ref 70–99)
HDLC SERPL-MCNC: 58 MG/DL
LDLC SERPL CALC-MCNC: 125 MG/DL
NONHDLC SERPL-MCNC: 156 MG/DL
POTASSIUM SERPL-SCNC: 4.7 MMOL/L (ref 3.4–5.3)
SODIUM SERPL-SCNC: 142 MMOL/L (ref 135–145)
TRIGL SERPL-MCNC: 154 MG/DL

## 2024-01-23 LAB
BKR LAB AP GYN ADEQUACY: ABNORMAL
BKR LAB AP GYN INTERPRETATION: ABNORMAL
BKR LAB AP HPV REFLEX: ABNORMAL
BKR LAB AP PREVIOUS ABNL DX: ABNORMAL
BKR LAB AP PREVIOUS ABNORMAL: ABNORMAL
PATH REPORT.COMMENTS IMP SPEC: ABNORMAL
PATH REPORT.COMMENTS IMP SPEC: ABNORMAL
PATH REPORT.RELEVANT HX SPEC: ABNORMAL

## 2024-01-25 ENCOUNTER — PATIENT OUTREACH (OUTPATIENT)
Dept: FAMILY MEDICINE | Facility: CLINIC | Age: 59
End: 2024-01-25
Payer: COMMERCIAL

## 2024-01-25 LAB
HUMAN PAPILLOMA VIRUS 16 DNA: NEGATIVE
HUMAN PAPILLOMA VIRUS 18 DNA: NEGATIVE
HUMAN PAPILLOMA VIRUS FINAL DIAGNOSIS: ABNORMAL
HUMAN PAPILLOMA VIRUS OTHER HR: POSITIVE

## 2024-02-11 ENCOUNTER — HEALTH MAINTENANCE LETTER (OUTPATIENT)
Age: 59
End: 2024-02-11

## 2024-02-23 NOTE — PATIENT INSTRUCTIONS
If you have any questions regarding your visit, Please contact your care team.    [facility]: [phone]      [facility] CLINIC HOURS TELEPHONE NUMBER   Jaz Cash DO.    KENNY Oconnor-Surgery Scheduler  Hallie - Surgery Scheduler    NAS Vazquez RN Kylie, RN     Monday, Thursday  [place]  7am-3pm    Tuesday, Wednesday  [place]  7am-3pm    Friday  [place]  1pm-3:30pm    Typical Surgery Days: Thursday or Friday   [facility]  [address] [place] [address] [phone] Phone  [phone] Fax    [facility]  [address] [place] [address] [phone] Phone    Imaging [phone] Phone    [facility]:  [phone] Phone     **Surgeries** Our Surgery Schedulers will contact you to schedule. If you do not receive a call within 3 business days, please call [phone].    Urgent Care locations:  [facility] Saturday and    9 am - 5 pm    Monday-Friday   12 pm - 8 pm  Saturday and    9 am - 5 pm   [phone]       If you need a medication refill, please contact your pharmacy. Please allow 3 business days for your refill to be completed.  As always, Thank you for trusting us with your healthcare needs!

## 2024-02-27 ENCOUNTER — OFFICE VISIT (OUTPATIENT)
Dept: OBGYN | Facility: CLINIC | Age: 59
End: 2024-02-27
Payer: COMMERCIAL

## 2024-02-27 ENCOUNTER — TELEPHONE (OUTPATIENT)
Dept: OBGYN | Facility: CLINIC | Age: 59
End: 2024-02-27

## 2024-02-27 VITALS
WEIGHT: 223.4 LBS | HEART RATE: 94 BPM | DIASTOLIC BLOOD PRESSURE: 83 MMHG | BODY MASS INDEX: 38.95 KG/M2 | SYSTOLIC BLOOD PRESSURE: 131 MMHG

## 2024-02-27 DIAGNOSIS — R87.613 HSIL (HIGH GRADE SQUAMOUS INTRAEPITHELIAL LESION) ON PAP SMEAR OF CERVIX: Primary | ICD-10-CM

## 2024-02-27 PROCEDURE — 99203 OFFICE O/P NEW LOW 30 MIN: CPT | Mod: 57 | Performed by: OBSTETRICS & GYNECOLOGY

## 2024-02-27 NOTE — TELEPHONE ENCOUNTER
Associated Diagnoses    HSIL (high grade squamous intraepithelial lesion) on Pap smear of cervix [R87.613]  - Primary        Source Order Set    Order Set Name Order ID    [id number]     Case Request: Case Info    Panel 1    Providers    Provider Role Service   Jaz Cash DO Primary      Procedures    Procedure Laterality Anesthesia Region   COLPOSCOPY, WITH CERVICAL CONE BIOPSY N/A MAC Cervix                  Requested date:   Location:  OR   Patient class: Outpatient      Pre-op diagnoses: HSIL (high grade squamous intraepithelial lesion) on Pap smear of cervix     Scheduling Instructions    Additional Instructions for the Case  Surgical Assistant: No  Multi Surgeon Case No  CSC okay No  H&P:  Pre-op options: PCP  Post-op:  4 weeks  Sterilization consent:  Not applicable to procedure being performed.  Vendor: No  Surgical time needed: Average  ERAS patient: Yes  If scheduling for D&E does Laminaria placement: No   SURGERY SCHEDULING AND PRECERTIFICATION    Medical Record Number: [id number]  Yue Mays  YOB: 1965   Phone: [phone] (home)   Primary Provider: Charlee Ga    Reason for Admit:  ICD-10 CODE:  R87.613    Surgeon: Jaz Cash DO  Surgical Procedure: COLPOSCOPY, WITH CERVICAL CONE BIOPSY    Date of Surgery 04/04 Time of Surgery 10am  Surgery to be performed at:  [facility]   Status: Outpatient  Type of Anesthesia Anticipated: MAC    Sterilization consent:  Not applicable to procedure being performed.    Pre-Op: On 03/18 with Charlee Ga at [facility]  COVID testing:  Per Provider's discretion Covid testing is not indicated.     Post-Op:  4 weeks on 05/01 with Dr Cash at [facility]    Pre-certification routed to Financial Counselors:  Auto routes via Case Request    Surgery packet mailed to patient's home address: Yes  Patient instructed NPO 12 hours prior to surgery, arrive according to the time the nurse gives patient when  called prior to surgery, must have a .  Patient understood and agrees to the plan.      Requestor:  Miladys Jiang     Location:  [place] [id number]

## 2024-02-27 NOTE — PROGRESS NOTES
SUBJECTIVE:       HPI: Yue Mays is a 58 year old   who presents today for consultation for abnormal pap smears, referred from Charlee Ga     Pap history:  1/20/15 LSIL, + HR HPV (Not 16/18). Plan colp  2/16/15 [place] LSIL. Plan cotest in 1 year.  16 ASCUS Pap, + HR HPV (Not 16/18). Plan colp  16 [place] ECC - negative. Plan cotest in 1 year.  17 LSIL Pap, + HR HPV (Not 16/18) with endometrial cells. Plan colp  3/5/18 3 month [place] not done, updated to 6mo [place]/Pap due by 18 [place] E[facility] PAVAN 1, LSIL Pap, + HR HPV (Neg 16/18). Plan cotest in 1 year.  19 Lost to follow-up for pap tracking  19 LSIL pap, + HR HPV (not 16 or 18). Plan colp.  10/9/19 [place] ECC with CIN1, Plan: cotest in one year  3/3/21 Lost to follow-up for pap tracking  24 HSIL, +HR HPV (not 16/18). Plan: await provider    History of STI: HPV  Number of partners: 15  Gardasil vaccination: No  Menopause: 55yo. No hx HRT  Tobacco use: No    Ob Hx:  s/p   OB History    Para Term  AB Living   4 2 2 0 2 2   SAB IAB Ectopic Multiple Live Births   2 0 0 0 2      # Outcome Date GA Lbr Ethan/2nd Weight Sex Delivery Anes PTL Lv   4 Term         RADHA   3 Term         RADHA   2 SAB      SAB      1 SAB      SAB         Obstetric Comments   2 miscarriages    .           reports that she has never smoked. She has never used smokeless tobacco.      Today's PHQ-2 Score:       2024     8:07 AM   PHQ-2 (  [employer])   Q1: Little interest or pleasure in doing things 0   Q2: Feeling down, depressed or hopeless 0   PHQ-2 Score 0   Q1: Little interest or pleasure in doing things Not at all   Q2: Feeling down, depressed or hopeless Not at all   PHQ-2 Score 0     Today's PHQ-9 Score:       3/11/2020     6:44 AM   PHQ-9 SCORE   PHQ-9 External Data 8     Today's NAIN-7 Score:       2019     1:06 PM   NAIN-7 SCORE   Total Score 0       Problem list and histories reviewed & adjusted, as  indicated.  Additional history: as documented.    Patient Active Problem List   Diagnosis    CARDIOVASCULAR SCREENING; LDL GOAL LESS THAN 160    Urinary incontinence due to urethral sphincter incompetence    Cystocele, midline    Urgency of urination    Female stress incontinence    Papanicolaou smear of cervix with low grade squamous intraepithelial lesion (LGSIL)    Depression with anxiety    Cough    BMI 40.0-44.9, adult (H)    Fatigue, unspecified type    Memory changes    Advanced directives, counseling/discussion     Past Surgical History:   Procedure Laterality Date    BIOPSY  1989    had a 2 colposcopies for moderate displaysia    COLONOSCOPY N/A 12/31/2015    Procedure: COLONOSCOPY;  Surgeon: Arpit Tony MD;  Location: [facility]    COLONOSCOPY N/A 12/31/2015    Procedure: COMBINED COLONOSCOPY, SINGLE OR MULTIPLE BIOPSY/POLYPECTOMY BY BIOPSY;  Surgeon: Arpit Tony MD;  Location: [facility]    COLONOSCOPY WITH CO2 INSUFFLATION N/A 12/31/2015    Procedure: COLONOSCOPY WITH CO2 INSUFFLATION;  Surgeon: Arpit Tony MD;  [facility]    COLPORRHAPHY ANTERIOR  8/21/2012    Procedure: COLPORRHAPHY ANTERIOR;  Cardinal Ligament Repair with Anterior Colporrhaphy and a Midurethral Sling;  Surgeon: Valentine Vera MD;  [facility]    GENITOURINARY SURGERY  8/2013    3 slings put in    SLING TRANSVAGINAL  8/21/2012    Procedure: SLING TRANSVAGINAL;;  Surgeon: Valentine Vera MD;  [facility]    SLING TRANSVAGINAL  11/12/2012    Procedure: SLING TRANSVAGINAL;  Cysto with TVT (transobturator approach);  Surgeon: Valentine Vera MD;  Location: [facility]      Social History     Tobacco Use    Smoking status: Never    Smokeless tobacco: Never   Substance Use Topics    Alcohol use: Not Currently     Alcohol/week: 0.0 standard drinks of alcohol     Comment: minimal - maybe once a month      Problem (# of Occurrences) Relation (Name,Age of Onset)    Anxiety Disorder (2) Daughter (Linette  Davon), Daughter (Radha Mays)    Arthritis (1) Father (Cooper Bob)    Cancer (1) Mother (January Bob): hodgkins    Depression (2) Daughter (Linette Mays), Daughter (Radha Mays)    Diabetes (2) Father (Cooper Bob): I don't recall if he had diabetes, Sister    Hypertension (1) Father (Cooper Bob)    Breast Cancer (1) Mother (January Bob): early 30s     Other Cancer (1) Mother (January Bob): Hodgkin's Disease, Breast Cancer    Depression/Anxiety (2) Father (Cooper Bob): undiagnosed, Sister: undiagnosed              albuterol (PROAIR HFA/PROVENTIL HFA/VENTOLIN HFA) 108 (90 Base) MCG/ACT inhaler, INHALE 2 PUFFS INTO THE LUNGS EVERY 4 HOURS AS NEEDED FOR SHORTNESS OF BREATH, WHEEZING OR COUGH  FLOVENT  MCG/ACT inhaler, INHALE 2 PUFFS INTO THE LUNGS TWICE A DAY  montelukast (SINGULAIR) 10 MG tablet, TAKE 1 TABLET BY MOUTH EVERYDAY AT BEDTIME  hydrocortisone (CORTAID) 1 % external ointment, Apply topically 2 times daily    No current facility-administered medications on file prior to visit.    Allergies   Allergen Reactions    Nkda [No Known Drug Allergy]        ROS:  10 Point review of systems negative other noted above in HPI    OBJECTIVE:     /83   Pulse 94   Wt 101.3 kg (223 lb 6.4 oz)   LMP  (LMP Unknown)   BMI 38.95 kg/m    Body mass index is 38.95 kg/m .      Gen: Alert, oriented, appropriately interactive, NAD  Eyes: Eyes grossly normal to inspection, conjunctivae and sclerae normal  Resp: no audible wheeze, cough, or visible cyanosis.  No visible retractions or increased work of breathing.  Able to speak fully in complete sentences.  Neuro: Cranial nerves grossly intact, mentation intact and speech normal  Psych: mentation appears normal, affect normal/bright, judgement and insight intact, normal speech and appearance well-groomed      In-Clinic Test Results:  No results found for this or any previous visit (from the past 24 hour(s)).    ASSESSMENT/PLAN:                                                       Yue Mays is a 58 year old  who presents today for       ICD-10-CM    1. HSIL (high grade squamous intraepithelial lesion) on Pap smear of cervix  R87.613 Case Request: COLPOSCOPY, WITH CERVICAL CONE BIOPSY     Case Request: COLPOSCOPY, WITH CERVICAL CONE BIOPSY          ASCCP guidelines reviewed as well as the patient's personal pap and colposcopy history. Options going forward reviewed, including colposcopy +/- LEEP/CKC. RBA reviewed, and after counseling, patient has requested to go forward with CKC in the OR. Requesting procedure to be done in [facility].  Details of the procedure were discussed with the patient.  Risks include, but are not limited to, bleeding, infection, and injury to surrounding organs such as the bowel, urinary system, nerves, and blood vessels.  Injury may result in repair at the time of the surgery or in a separate procedure.  All questions answered, and accepting these risks, the patient elects to proceed with the procedure.     Thank you for allowing me to participate in the care of your patient.       I have personally reviewed this patient' prior pap and colposcopy results/reports.    20 minutes spent on the date of the encounter doing chart review, history and exam, documentation and further activities as noted above     Jaz Cash [facility]

## 2024-03-18 ENCOUNTER — ANCILLARY PROCEDURE (OUTPATIENT)
Dept: MAMMOGRAPHY | Facility: CLINIC | Age: 59
End: 2024-03-18
Attending: NURSE PRACTITIONER
Payer: COMMERCIAL

## 2024-03-18 ENCOUNTER — OFFICE VISIT (OUTPATIENT)
Dept: FAMILY MEDICINE | Facility: CLINIC | Age: 59
End: 2024-03-18
Payer: COMMERCIAL

## 2024-03-18 ENCOUNTER — ANCILLARY ORDERS (OUTPATIENT)
Dept: FAMILY MEDICINE | Facility: CLINIC | Age: 59
End: 2024-03-18

## 2024-03-18 VITALS
RESPIRATION RATE: 20 BRPM | HEART RATE: 94 BPM | TEMPERATURE: 97.9 F | SYSTOLIC BLOOD PRESSURE: 121 MMHG | HEIGHT: 64 IN | WEIGHT: 227 LBS | BODY MASS INDEX: 38.76 KG/M2 | DIASTOLIC BLOOD PRESSURE: 82 MMHG | OXYGEN SATURATION: 96 %

## 2024-03-18 DIAGNOSIS — H60.543 ECZEMA OF EXTERNAL EAR, BILATERAL: ICD-10-CM

## 2024-03-18 DIAGNOSIS — Z13.6 CARDIOVASCULAR SCREENING; LDL GOAL LESS THAN 160: ICD-10-CM

## 2024-03-18 DIAGNOSIS — Z12.31 ENCOUNTER FOR SCREENING MAMMOGRAM FOR BREAST CANCER: ICD-10-CM

## 2024-03-18 DIAGNOSIS — Z13.1 SCREENING FOR DIABETES MELLITUS: ICD-10-CM

## 2024-03-18 DIAGNOSIS — Z00.00 ROUTINE GENERAL MEDICAL EXAMINATION AT A HEALTH CARE FACILITY: Primary | ICD-10-CM

## 2024-03-18 DIAGNOSIS — Z12.4 SCREENING FOR MALIGNANT NEOPLASM OF CERVIX: ICD-10-CM

## 2024-03-18 DIAGNOSIS — Z01.818 PREOP GENERAL PHYSICAL EXAM: Primary | ICD-10-CM

## 2024-03-18 DIAGNOSIS — J45.20 MILD INTERMITTENT ASTHMA WITHOUT COMPLICATION: ICD-10-CM

## 2024-03-18 DIAGNOSIS — R87.613 HSIL (HIGH GRADE SQUAMOUS INTRAEPITHELIAL LESION) ON PAP SMEAR OF CERVIX: ICD-10-CM

## 2024-03-18 PROCEDURE — 77067 SCR MAMMO BI INCL CAD: CPT | Mod: TC | Performed by: RADIOLOGY

## 2024-03-18 PROCEDURE — 99214 OFFICE O/P EST MOD 30 MIN: CPT | Performed by: NURSE PRACTITIONER

## 2024-03-18 PROCEDURE — 77063 BREAST TOMOSYNTHESIS BI: CPT | Mod: TC | Performed by: RADIOLOGY

## 2024-03-18 ASSESSMENT — PAIN SCALES - GENERAL: PAINLEVEL: NO PAIN (0)

## 2024-03-18 NOTE — PATIENT INSTRUCTIONS
Preparing for Your Surgery  Getting started  A nurse will call you to review your health history and instructions. They will give you an arrival time based on your scheduled surgery time. Please be ready to share:  Your doctor's clinic name and phone number  Your medical, surgical, and anesthesia history  A list of allergies and sensitivities  A list of medicines, including herbal treatments and over-the-counter drugs  Whether the patient has a legal guardian (ask how to send us the papers in advance)  Please tell us if you're pregnant--or if there's any chance you might be pregnant. Some surgeries may injure a fetus (unborn baby), so they require a pregnancy test. Surgeries that are safe for a fetus don't always need a test, and you can choose whether to have one.   If you have a child who's having surgery, please ask for a copy of Preparing for Your Child's Surgery.    Preparing for surgery  Within 10 to 30 days of surgery: Have a pre-op exam (sometimes called an H&P, or History and Physical). This can be done at a clinic or pre-operative center.  If you're having a , you may not need this exam. Talk to your care team.  At your pre-op exam, talk to your care team about all medicines you take. If you need to stop any medicines before surgery, ask when to start taking them again.  We do this for your safety. Many medicines can make you bleed too much during surgery. Some change how well surgery (anesthesia) drugs work.  Call your insurance company to let them know you're having surgery. (If you don't have insurance, call [phone].)  Call your clinic if there's any change in your health. This includes signs of a cold or flu (sore throat, runny nose, cough, rash, fever). It also includes a scrape or scratch near the surgery site.  If you have questions on the day of surgery, call your hospital or surgery center.  Eating and drinking guidelines  For your safety: Unless your surgeon tells you otherwise,  follow the guidelines below.  Eat and drink as usual until 8 hours before you arrive for surgery. After that, no food or milk.  Drink clear liquids until 2 hours before you arrive. These are liquids you can see through, like water, Gatorade, and Propel Water. They also include plain black coffee and tea (no cream or milk), candy, and breath mints. You can spit out gum when you arrive.  If you drink alcohol: Stop drinking it the night before surgery.  If your care team tells you to take medicine on the morning of surgery, it's okay to take it with a sip of water.  Preventing infection  Shower or bathe the night before and morning of your surgery. Follow the instructions your clinic gave you. (If no instructions, use regular soap.)  Don't shave or clip hair near your surgery site. We'll remove the hair if needed.  Don't smoke or vape the morning of surgery. You may chew nicotine gum up to 2 hours before surgery. A nicotine patch is okay.  Note: Some surgeries require you to completely quit smoking and nicotine. Check with your surgeon.  Your care team will make every effort to keep you safe from infection. We will:  Clean our hands often with soap and water (or an alcohol-based hand rub).  Clean the skin at your surgery site with a special soap that kills germs.  Give you a special gown to keep you warm. (Cold raises the risk of infection.)  Wear special hair covers, masks, gowns and gloves during surgery.  Give antibiotic medicine, if prescribed. Not all surgeries need antibiotics.  What to bring on the day of surgery  Photo ID and insurance card  Copy of your health care directive, if you have one  Glasses and hearing aids (bring cases)  You can't wear contacts during surgery  Inhaler and eye drops, if you use them (tell us about these when you arrive)  CPAP machine or breathing device, if you use them  A few personal items, if spending the night  If you have . . .  A pacemaker, ICD (cardiac defibrillator) or other  implant: Bring the ID card.  An implanted stimulator: Bring the remote control.  A legal guardian: Bring a copy of the certified (court-stamped) guardianship papers.  Please remove any jewelry, including body piercings. Leave jewelry and other valuables at home.  If you're going home the day of surgery  You must have a responsible adult drive you home. They should stay with you overnight as well.  If you don't have someone to stay with you, and you aren't safe to go home alone, we may keep you overnight. Insurance often won't pay for this.  After surgery  If it's hard to control your pain or you need more pain medicine, please call your surgeon's office.  Questions?   If you have any questions for your care team, list them here: _________________________________________________________________________________________________________________________________________________________________________ ____________________________________ ____________________________________ ____________________________________  For informational purposes only. Not to replace the advice of your health care provider. Copyright   2003, 2019 [facility]. All rights reserved. Clinically reviewed by Princess Lane MD. [employer] 087112 - REV 12/22.    How to Take Your Medication Before Surgery  - Take all of your medications before surgery as usual

## 2024-03-18 NOTE — PROGRESS NOTES
Preoperative Evaluation  [facility]  [address] [facility] [address]  Phone: [phone]  Primary Provider: Charlee Ga  Pre-op Performing Provider: CHARLEE GA 18, 2024       Yue is a 58 year old, presenting for the following:  Pre-Op Exam        3/18/2024     8:24 AM   Additional Questions   Roomed by Lety KEE   Accompanied by self     Surgical Information  Surgery/Procedure: COLPOSCOPY, WITH CERVICAL CONE BIOPSY   Surgery Location: [place]   Surgeon: Jaz Cash DO   Surgery Date: 04/04/2024  Time of Surgery: 1010AM  Where patient plans to recover: At home with family  Fax number for surgical facility: Note does not need to be faxed, will be available electronically in Epic.    Assessment & Plan     The proposed surgical procedure is considered LOW risk.    Preop general physical exam      HSIL (high grade squamous intraepithelial lesion) on Pap smear of cervix  -plan for colposcopy and cervical cone biopsy    Mild intermittent asthma without complication  -patient reports mild cough due to allergies. No wheezes or increased work of breathing. Continue PRN albuterol, Flovent, montelukast.             - No identified additional risk factors other than previously addressed    Antiplatelet or Anticoagulation Medication Instructions   - Patient is on no antiplatelet or anticoagulation medications.    Additional Medication Instructions  Patient is to take all scheduled medications on the day of surgery    Recommendation  APPROVAL GIVEN to proceed with proposed procedure, without further diagnostic evaluation.          Subjective       HPI related to upcoming procedure: Pap history:  1/20/15 LSIL, + HR HPV (Not 16/18). Plan colp  2/16/15 [place] LSIL. Plan cotest in 1 year.  6/16/16 ASCUS Pap, + HR HPV (Not 16/18). Plan colp  7/11/16 [place] ECC - negative. Plan cotest in 1 year.  11/20/17 LSIL Pap, + HR HPV (Not 16/18) with endometrial cells. Plan colp  3/5/18  3 month [place] not done, updated to 6mo [place]/Pap due by 5/20/18 6/20/18 [place] ECC PAVAN 1, LSIL Pap, + HR HPV (Neg 16/18). Plan cotest in 1 year.  7/24/19 Lost to follow-up for pap tracking  8/29/19 LSIL pap, + HR HPV (not 16 or 18). Plan colp.  10/9/19 [place] ECC with CIN1, Plan: cotest in one year  3/3/21 Lost to follow-up for pap tracking  1/18/24 HSIL, +HR HPV (not 16/18).     Plan for colposcopy with cervical cone biopsy.         3/17/2024    11:14 PM   Preop Questions   1. Have you ever had a heart attack or stroke? No   2. Have you ever had surgery on your heart or blood vessels, such as a stent placement, a coronary artery bypass, or surgery on an artery in your head, neck, heart, or legs? No   3. Do you have chest pain with activity? No   4. Do you have a history of  heart failure? No   5. Do you currently have a cold, bronchitis or symptoms of other infection? No   6. Do you have a cough, shortness of breath, or wheezing? YES - allergy cough   7. Do you or anyone in your family have previous history of blood clots? No   8. Do you or does anyone in your family have a serious bleeding problem such as prolonged bleeding following surgeries or cuts? No   9. Have you ever had problems with anemia or been told to take iron pills? No   10. Have you had any abnormal blood loss such as black, tarry or bloody stools, or abnormal vaginal bleeding? No   11. Have you ever had a blood transfusion? No   12. Are you willing to have a blood transfusion if it is medically needed before, during, or after your surgery? Yes   13. Have you or any of your relatives ever had problems with anesthesia? No   14. Do you have sleep apnea, excessive snoring or daytime drowsiness? No   15. Do you have any artifical heart valves or other implanted medical devices like a pacemaker, defibrillator, or continuous glucose monitor? No   16. Do you have artificial joints? No   17. Are you allergic to latex? No   18. Is there any chance that you may  be pregnant? No       Health Care Directive  Patient does not have a Health Care Directive or Living Will: Discussed advance care planning with patient; information given to patient to review.    Preoperative Review of    reviewed - no record of controlled substances prescribed.      Status of Chronic Conditions:  ASTHMA - Patient has a longstanding history of moderate-severe Asthma . Patient has been doing well overall noting COUGH and continues on medication regimen consisting of PRN albuterol, Flovent, and montelukast without adverse reactions or side effects.     Patient Active Problem List    Diagnosis Date Noted    HSIL (high grade squamous intraepithelial lesion) on Pap smear of cervix 02/27/2024     Priority: Medium    Advanced directives, counseling/discussion 07/29/2022     Priority: Medium     Pt was given info on ADV. Michelle Martinez MA        BMI 40.0-44.9, adult (H) 02/24/2020     Priority: Medium    Fatigue, unspecified type 02/24/2020     Priority: Medium    Memory changes 02/24/2020     Priority: Medium    Cough 08/29/2019     Priority: Medium    Depression with anxiety 07/12/2016     Priority: Medium    Papanicolaou smear of cervix with low grade squamous intraepithelial lesion (LGSIL) 01/20/2015     Priority: Medium     1/20/15 LSIL, + HR HPV (Not 16/18). Plan colp  2/16/15 [place] LSIL. Plan cotest in 1 year.   6/16/16 ASCUS Pap, + HR HPV (Not 16/18). Plan colp  7/11/16 [place] ECC - negative. Plan cotest in 1 year.   11/20/17 LSIL Pap, + HR HPV (Not 16/18) with endometrial cells. Plan colp  3/5/18 3 month [place] not done, updated to 6mo [place]/Pap due by 5/20/18 6/20/18 [place] ECC PAVAN 1, LSIL Pap, + HR HPV (Neg 16/18). Plan cotest in 1 year.   7/24/19 Lost to follow-up for pap tracking  8/29/19 LSIL pap, + HR HPV (not 16 or 18). Plan colp.   10/9/19 [place] ECC with CIN1, Plan: cotest in one year  3/3/21 Lost to follow-up for pap tracking  1/18/24 HSIL, +HR HPV (not 16/18). Plan: LEEP/treatment due before  4/18/24 per provider  1/26/24 Pt notified      Cystocele, midline 07/23/2012     Priority: Medium    Urgency of urination 07/23/2012     Priority: Medium    Female stress incontinence 07/23/2012     Priority: Medium    Urinary incontinence due to urethral sphincter incompetence 06/12/2012     Priority: Medium    CARDIOVASCULAR SCREENING; LDL GOAL LESS THAN 160 10/31/2010     Priority: Medium      Past Medical History:   Diagnosis Date    Abnormal Pap smear of cervix 1/2015, 2016, 2017, 8/29/19    See problem list    Cervical high risk HPV (human papillomavirus) test positive 2015, 06/16/2016, 2017, 8/29/19    See problem list    Depressive disorder     H/O colposcopy with cervical biopsy 02/2015    LSIL    History of colposcopy 07/11/2016    ECC - negative    Infected sebaceous cyst 04/27/2018    Mild dysplasia of cervix (PAVAN I) 2019    see problem list    Urinary incontinence      Past Surgical History:   Procedure Laterality Date    BIOPSY  1989    had a 2 colposcopies for moderate displaysia    COLONOSCOPY N/A 12/31/2015    Procedure: COLONOSCOPY;  Surgeon: Arpit Tony MD;  Location: [facility]    COLONOSCOPY N/A 12/31/2015    Procedure: COMBINED COLONOSCOPY, SINGLE OR MULTIPLE BIOPSY/POLYPECTOMY BY BIOPSY;  Surgeon: Arpit Tony MD;  Location: [facility]    COLONOSCOPY  12/31/2015    COLONOSCOPY WITH CO2 INSUFFLATION N/A 12/31/2015    Procedure: COLONOSCOPY WITH CO2 INSUFFLATION;  Surgeon: Arpit Tony MD;  Location: [facility]    COLPORRHAPHY ANTERIOR  08/21/2012    Procedure: COLPORRHAPHY ANTERIOR;  Cardinal Ligament Repair with Anterior Colporrhaphy and a Midurethral Sling;  Surgeon: Valentine Vera MD;  [facility]    EYE SURGERY      lasik both eyes  don't remember date,    GENITOURINARY SURGERY  08/2013    3 slings put in    GI SURGERY  12/31/2015    SLING TRANSVAGINAL  08/21/2012    Procedure: SLING TRANSVAGINAL;;  Surgeon: Valentine Vera MD;  [facility]: [facility]    SLING  "TRANSVAGINAL  11/12/2012    Procedure: SLING TRANSVAGINAL;  Cysto with TVT (transobturator approach);  Surgeon: Valentine Vera MD;  Location: [facility] OR     Current Outpatient Medications   Medication Sig Dispense Refill    albuterol (PROAIR HFA/PROVENTIL HFA/VENTOLIN HFA) 108 (90 Base) MCG/ACT inhaler INHALE 2 PUFFS INTO THE LUNGS EVERY 4 HOURS AS NEEDED FOR SHORTNESS OF BREATH, WHEEZING OR COUGH 6.7 g 1    FLOVENT  MCG/ACT inhaler INHALE 2 PUFFS INTO THE LUNGS TWICE A DAY 12 g 1    montelukast (SINGULAIR) 10 MG tablet TAKE 1 TABLET BY MOUTH EVERYDAY AT BEDTIME 90 tablet 3       Allergies   Allergen Reactions    Nkda [No Known Drug Allergy]         Social History     Tobacco Use    Smoking status: Never    Smokeless tobacco: Never   Substance Use Topics    Alcohol use: Yes     Comment: minimal - maybe once a month       History   Drug Use No         Review of Systems    Review of Systems  Constitutional, HEENT, cardiovascular, pulmonary, GI, , musculoskeletal, neuro, skin, endocrine and psych systems are negative, except as otherwise noted.    Objective    /82   Pulse 94   Temp 97.9  F (36.6  C) (Tympanic)   Resp 20   Ht 1.613 m (5' 3.5\")   Wt 103 kg (227 lb)   LMP  (LMP Unknown)   SpO2 96%   BMI 39.58 kg/m     Estimated body mass index is 39.58 kg/m  as calculated from the following:    Height as of this encounter: 1.613 m (5' 3.5\").    Weight as of this encounter: 103 kg (227 lb).  Physical Exam  GENERAL: alert and no distress  EYES: Eyes grossly normal to inspection, PERRL and conjunctivae and sclerae normal  HENT: ear canals and TM's normal, nose and mouth without ulcers or lesions  NECK: no adenopathy, no asymmetry, masses, or scars  RESP: lungs clear to auscultation - no rales, rhonchi or wheezes  CV: regular rate and rhythm, normal S1 S2, no S3 or S4, no murmur, click or rub, no peripheral edema  ABDOMEN: soft, nontender, no hepatosplenomegaly, no masses and bowel sounds normal  MS: no " gross musculoskeletal defects noted, no edema  SKIN: no suspicious lesions or rashes  NEURO: Normal strength and tone, mentation intact and speech normal  PSYCH: mentation appears normal, affect normal/bright    Recent Labs   Lab Test 01/18/24  0907      POTASSIUM 4.7   CR 0.80   A1C 5.4        Diagnostics  No labs were ordered during this visit.   No EKG required for low risk surgery (cataract, skin procedure, breast biopsy, etc).  No EKG required, no history of coronary heart disease, significant arrhythmia, peripheral arterial disease or other structural heart disease.    Revised Cardiac Risk Index (RCRI)  The patient has the following serious cardiovascular risks for perioperative complications:   - No serious cardiac risks = 0 points     RCRI Interpretation: 0 points: Class I (very low risk - 0.4% complication rate)         Signed Electronically by: IRWIN Oliveros CNP  Copy of this evaluation report is provided to requesting physician.

## 2024-03-31 ENCOUNTER — ANESTHESIA EVENT (OUTPATIENT)
Dept: SURGERY | Facility: AMBULATORY SURGERY CENTER | Age: 59
End: 2024-03-31
Payer: COMMERCIAL

## 2024-03-31 NOTE — ANESTHESIA PREPROCEDURE EVALUATION
Anesthesia Pre-Procedure Evaluation    Patient: Yue Mays   MRN: [id number] : 1965        Procedure : Procedure(s):  COLPOSCOPY, WITH CERVICAL CONE BIOPSY          Past Medical History:   Diagnosis Date    Abnormal Pap smear of cervix 2015, , , 19    See problem list    Cervical high risk HPV (human papillomavirus) test positive , 2016, , 19    See problem list    Depressive disorder     H/O colposcopy with cervical biopsy 2015    LSIL    History of colposcopy 2016    ECC - negative    Infected sebaceous cyst 2018    Mild dysplasia of cervix (PAVAN I)     see problem list    Urinary incontinence       Past Surgical History:   Procedure Laterality Date    BIOPSY      had a 2 colposcopies for moderate displaysia    COLONOSCOPY N/A 2015    Procedure: COLONOSCOPY;  Surgeon: Arpit Tony MD;  Location: [facility]    COLONOSCOPY N/A 2015    Procedure: COMBINED COLONOSCOPY, SINGLE OR MULTIPLE BIOPSY/POLYPECTOMY BY BIOPSY;  Surgeon: Arpit Tony MD;  Location: [facility]    COLONOSCOPY  2015    COLONOSCOPY WITH CO2 INSUFFLATION N/A 2015    Procedure: COLONOSCOPY WITH CO2 INSUFFLATION;  Surgeon: Arpit Tony MD;  Location[facility]    COLPORRHAPHY ANTERIOR  2012    Procedure: COLPORRHAPHY ANTERIOR;  Cardinal Ligament Repair with Anterior Colporrhaphy and a Midurethral Sling;  Surgeon: Valentine Vera MD;  [facility]    EYE SURGERY      lasik both eyes  don't remember date,    GENITOURINARY SURGERY  2013    3 slings put in    GI SURGERY  2015    SLING TRANSVAGINAL  2012    Procedure: SLING TRANSVAGINAL;;  Surgeon: Valentine Vera MD;  [facility]    SLING TRANSVAGINAL  2012    Procedure: SLING TRANSVAGINAL;  Cysto with TVT (transobturator approach);  Surgeon: Valentine Vera MD;  Location: [facility]      Allergies   Allergen Reactions    Nkda [No Known Drug Allergy]        Social History     Tobacco Use    Smoking status: Never    Smokeless tobacco: Never   Substance Use Topics    Alcohol use: Yes     Comment: minimal - maybe once a month      Wt Readings from Last 1 Encounters:   03/18/24 103 kg (227 lb)        Anesthesia Evaluation            ROS/MED HX  ENT/Pulmonary:     (+)                     Intermittent, asthma                  Neurologic:  - neg neurologic ROS     Cardiovascular:  - neg cardiovascular ROS     METS/Exercise Tolerance:     Hematologic:  - neg hematologic  ROS     Musculoskeletal:  - neg musculoskeletal ROS     GI/Hepatic:  - neg GI/hepatic ROS     Renal/Genitourinary:  - neg Renal ROS     Endo:     (+)               Obesity,       Psychiatric/Substance Use:     (+) psychiatric history anxiety and depression       Infectious Disease:       Malignancy:       Other:            Physical Exam    Airway  airway exam normal      Mallampati: I       Respiratory Devices and Support         Dental       (+) Minor Abnormalities - some fillings, tiny chips      Cardiovascular   cardiovascular exam normal          Pulmonary   pulmonary exam normal                OUTSIDE LABS:  CBC:   Lab Results   Component Value Date    WBC 6.1 02/24/2020    WBC 6.4 11/20/2017    HGB 14.5 02/24/2020    HGB 14.6 11/20/2017    HCT 44.0 02/24/2020    HCT 44.0 11/20/2017     02/24/2020     11/20/2017     BMP:   Lab Results   Component Value Date     01/18/2024     08/30/2019    POTASSIUM 4.7 01/18/2024    POTASSIUM 4.2 08/30/2019    CHLORIDE 104 01/18/2024    CHLORIDE 105 08/30/2019    CO2 30 (H) 01/18/2024    CO2 26 08/30/2019    BUN 20.8 (H) 01/18/2024    BUN 12 08/30/2019    CR 0.80 01/18/2024    CR 0.67 08/30/2019    GLC 77 01/18/2024    GLC 80 08/30/2019     COAGS:   Lab Results   Component Value Date    INR 1.00 11/07/2012     POC:   Lab Results   Component Value Date     (H) 08/22/2012    HCG neg 11/12/2012     HEPATIC:   Lab Results   Component Value  "Date    ALBUMIN 3.3 (L) 08/30/2019    PROTTOTAL 7.2 08/30/2019    ALT 17 08/30/2019    AST 14 08/30/2019    ALKPHOS 89 08/30/2019    BILITOTAL 0.3 08/30/2019     OTHER:   Lab Results   Component Value Date    A1C 5.4 01/18/2024    VINCE 9.9 01/18/2024    TSH 1.72 02/24/2020       Anesthesia Plan    ASA Status:  2    NPO Status:  NPO Appropriate    Anesthesia Type: MAC.     - Reason for MAC: straight local not clinically adequate   Induction: Intravenous.   Maintenance: TIVA.        Consents    Anesthesia Plan(s) and associated risks, benefits, and realistic alternatives discussed. Questions answered and patient/representative(s) expressed understanding.     - Discussed: Risks, Benefits and Alternatives for BOTH SEDATION and the PROCEDURE were discussed     - Discussed with:  Patient            Postoperative Care    Pain management: IV analgesics, Oral pain medications, Multi-modal analgesia.   PONV prophylaxis: Ondansetron (or other 5HT-3), Dexamethasone or Solumedrol, Background Propofol Infusion     Comments:               Sb Cooper MD    I have reviewed the pertinent notes and labs in the chart from the past 30 days and (re)examined the patient.  Any updates or changes from those notes are reflected in this note.              # Obesity: Estimated body mass index is 39.58 kg/m  as calculated from the following:    Height as of 3/18/24: 1.613 m (5' 3.5\").    Weight as of 3/18/24: 103 kg (227 lb).      "

## 2024-04-04 ENCOUNTER — HOSPITAL ENCOUNTER (OUTPATIENT)
Facility: AMBULATORY SURGERY CENTER | Age: 59
Discharge: HOME | End: 2024-04-04
Attending: OBSTETRICS & GYNECOLOGY
Payer: COMMERCIAL

## 2024-04-04 ENCOUNTER — ANESTHESIA (OUTPATIENT)
Dept: SURGERY | Facility: AMBULATORY SURGERY CENTER | Age: 59
End: 2024-04-04
Payer: COMMERCIAL

## 2024-04-04 VITALS
HEART RATE: 72 BPM | OXYGEN SATURATION: 95 % | DIASTOLIC BLOOD PRESSURE: 57 MMHG | TEMPERATURE: 97 F | SYSTOLIC BLOOD PRESSURE: 119 MMHG | RESPIRATION RATE: 16 BRPM

## 2024-04-04 PROCEDURE — 57520 CONIZATION OF CERVIX: CPT | Performed by: OBSTETRICS & GYNECOLOGY

## 2024-04-04 PROCEDURE — 57520 CONIZATION OF CERVIX: CPT

## 2024-04-04 PROCEDURE — 57520 CONIZATION OF CERVIX: CPT | Performed by: NURSE ANESTHETIST, CERTIFIED REGISTERED

## 2024-04-04 PROCEDURE — G8907 PT DOC NO EVENTS ON DISCHARG: HCPCS

## 2024-04-04 PROCEDURE — 88307 TISSUE EXAM BY PATHOLOGIST: CPT | Performed by: STUDENT IN AN ORGANIZED HEALTH CARE EDUCATION/TRAINING PROGRAM

## 2024-04-04 PROCEDURE — G8918 PT W/O PREOP ORDER IV AB PRO: HCPCS

## 2024-04-04 PROCEDURE — 57520 CONIZATION OF CERVIX: CPT | Performed by: ANESTHESIOLOGY

## 2024-04-04 PROCEDURE — 88305 TISSUE EXAM BY PATHOLOGIST: CPT | Performed by: STUDENT IN AN ORGANIZED HEALTH CARE EDUCATION/TRAINING PROGRAM

## 2024-04-04 RX ORDER — ACETAMINOPHEN 325 MG/1
975 TABLET ORAL ONCE
Status: CANCELLED | OUTPATIENT
Start: 2024-04-04 | End: 2024-04-04

## 2024-04-04 RX ORDER — BUPIVACAINE HYDROCHLORIDE AND EPINEPHRINE 5; 5 MG/ML; UG/ML
INJECTION, SOLUTION PERINEURAL PRN
Status: DISCONTINUED | OUTPATIENT
Start: 2024-04-04 | End: 2024-04-04 | Stop reason: HOSPADM

## 2024-04-04 RX ORDER — PROPOFOL 10 MG/ML
INJECTION, EMULSION INTRAVENOUS CONTINUOUS PRN
Status: DISCONTINUED | OUTPATIENT
Start: 2024-04-04 | End: 2024-04-04

## 2024-04-04 RX ORDER — FENTANYL CITRATE 50 UG/ML
25 INJECTION, SOLUTION INTRAMUSCULAR; INTRAVENOUS EVERY 5 MIN PRN
Status: DISCONTINUED | OUTPATIENT
Start: 2024-04-04 | End: 2024-04-05 | Stop reason: HOSPADM

## 2024-04-04 RX ORDER — FENTANYL CITRATE 50 UG/ML
25 INJECTION, SOLUTION INTRAMUSCULAR; INTRAVENOUS
Status: DISCONTINUED | OUTPATIENT
Start: 2024-04-04 | End: 2024-04-05 | Stop reason: HOSPADM

## 2024-04-04 RX ORDER — ONDANSETRON 4 MG/1
4 TABLET, ORALLY DISINTEGRATING ORAL EVERY 30 MIN PRN
Status: DISCONTINUED | OUTPATIENT
Start: 2024-04-04 | End: 2024-04-05 | Stop reason: HOSPADM

## 2024-04-04 RX ORDER — HYDROXYZINE HYDROCHLORIDE 25 MG/1
25 TABLET, FILM COATED ORAL
Status: CANCELLED | OUTPATIENT
Start: 2024-04-04

## 2024-04-04 RX ORDER — LIDOCAINE 40 MG/G
CREAM TOPICAL
Status: DISCONTINUED | OUTPATIENT
Start: 2024-04-04 | End: 2024-04-05 | Stop reason: HOSPADM

## 2024-04-04 RX ORDER — OXYCODONE HYDROCHLORIDE 5 MG/1
5 TABLET ORAL
Status: DISCONTINUED | OUTPATIENT
Start: 2024-04-04 | End: 2024-04-05 | Stop reason: HOSPADM

## 2024-04-04 RX ORDER — LIDOCAINE HYDROCHLORIDE 20 MG/ML
INJECTION, SOLUTION INFILTRATION; PERINEURAL PRN
Status: DISCONTINUED | OUTPATIENT
Start: 2024-04-04 | End: 2024-04-04

## 2024-04-04 RX ORDER — NALOXONE HYDROCHLORIDE 0.4 MG/ML
0.1 INJECTION, SOLUTION INTRAMUSCULAR; INTRAVENOUS; SUBCUTANEOUS
Status: DISCONTINUED | OUTPATIENT
Start: 2024-04-04 | End: 2024-04-05 | Stop reason: HOSPADM

## 2024-04-04 RX ORDER — KETOROLAC TROMETHAMINE 30 MG/ML
INJECTION, SOLUTION INTRAMUSCULAR; INTRAVENOUS PRN
Status: DISCONTINUED | OUTPATIENT
Start: 2024-04-04 | End: 2024-04-04

## 2024-04-04 RX ORDER — DEXAMETHASONE SODIUM PHOSPHATE 4 MG/ML
4 INJECTION, SOLUTION INTRA-ARTICULAR; INTRALESIONAL; INTRAMUSCULAR; INTRAVENOUS; SOFT TISSUE
Status: DISCONTINUED | OUTPATIENT
Start: 2024-04-04 | End: 2024-04-05 | Stop reason: HOSPADM

## 2024-04-04 RX ORDER — SODIUM CHLORIDE, SODIUM LACTATE, POTASSIUM CHLORIDE, CALCIUM CHLORIDE 600; 310; 30; 20 MG/100ML; MG/100ML; MG/100ML; MG/100ML
INJECTION, SOLUTION INTRAVENOUS CONTINUOUS PRN
Status: DISCONTINUED | OUTPATIENT
Start: 2024-04-04 | End: 2024-04-04

## 2024-04-04 RX ORDER — ONDANSETRON 2 MG/ML
INJECTION INTRAMUSCULAR; INTRAVENOUS PRN
Status: DISCONTINUED | OUTPATIENT
Start: 2024-04-04 | End: 2024-04-04

## 2024-04-04 RX ORDER — SODIUM CHLORIDE, SODIUM LACTATE, POTASSIUM CHLORIDE, CALCIUM CHLORIDE 600; 310; 30; 20 MG/100ML; MG/100ML; MG/100ML; MG/100ML
INJECTION, SOLUTION INTRAVENOUS CONTINUOUS
Status: DISCONTINUED | OUTPATIENT
Start: 2024-04-04 | End: 2024-04-05 | Stop reason: HOSPADM

## 2024-04-04 RX ORDER — PROPOFOL 10 MG/ML
INJECTION, EMULSION INTRAVENOUS PRN
Status: DISCONTINUED | OUTPATIENT
Start: 2024-04-04 | End: 2024-04-04

## 2024-04-04 RX ORDER — FENTANYL CITRATE 50 UG/ML
50 INJECTION, SOLUTION INTRAMUSCULAR; INTRAVENOUS EVERY 5 MIN PRN
Status: DISCONTINUED | OUTPATIENT
Start: 2024-04-04 | End: 2024-04-05 | Stop reason: HOSPADM

## 2024-04-04 RX ORDER — ACETAMINOPHEN 325 MG/1
975 TABLET ORAL ONCE
Status: COMPLETED | OUTPATIENT
Start: 2024-04-04 | End: 2024-04-04

## 2024-04-04 RX ORDER — IBUPROFEN 400 MG/1
800 TABLET, FILM COATED ORAL ONCE
Status: CANCELLED | OUTPATIENT
Start: 2024-04-04 | End: 2024-04-04

## 2024-04-04 RX ORDER — OXYCODONE HYDROCHLORIDE 5 MG/1
5 TABLET ORAL
Status: CANCELLED | OUTPATIENT
Start: 2024-04-04

## 2024-04-04 RX ORDER — IODINE AND POTASSIUM IODIDE 50; 100 MG/ML; MG/ML
LIQUID ORAL PRN
Status: DISCONTINUED | OUTPATIENT
Start: 2024-04-04 | End: 2024-04-04 | Stop reason: HOSPADM

## 2024-04-04 RX ORDER — ONDANSETRON 2 MG/ML
4 INJECTION INTRAMUSCULAR; INTRAVENOUS EVERY 30 MIN PRN
Status: DISCONTINUED | OUTPATIENT
Start: 2024-04-04 | End: 2024-04-05 | Stop reason: HOSPADM

## 2024-04-04 RX ORDER — FENTANYL CITRATE 50 UG/ML
INJECTION, SOLUTION INTRAMUSCULAR; INTRAVENOUS PRN
Status: DISCONTINUED | OUTPATIENT
Start: 2024-04-04 | End: 2024-04-04

## 2024-04-04 RX ORDER — FERRIC SUBSULFATE 0.21 G/G
LIQUID TOPICAL PRN
Status: DISCONTINUED | OUTPATIENT
Start: 2024-04-04 | End: 2024-04-04 | Stop reason: HOSPADM

## 2024-04-04 RX ORDER — OXYCODONE HYDROCHLORIDE 5 MG/1
10 TABLET ORAL
Status: DISCONTINUED | OUTPATIENT
Start: 2024-04-04 | End: 2024-04-05 | Stop reason: HOSPADM

## 2024-04-04 RX ADMIN — PROPOFOL 150 MCG/KG/MIN: 10 INJECTION, EMULSION INTRAVENOUS at 09:59

## 2024-04-04 RX ADMIN — ACETAMINOPHEN 975 MG: 325 TABLET ORAL at 09:22

## 2024-04-04 RX ADMIN — PROPOFOL 50 MG: 10 INJECTION, EMULSION INTRAVENOUS at 09:58

## 2024-04-04 RX ADMIN — FENTANYL CITRATE 50 MCG: 50 INJECTION, SOLUTION INTRAMUSCULAR; INTRAVENOUS at 09:57

## 2024-04-04 RX ADMIN — LIDOCAINE HYDROCHLORIDE 50 MG: 20 INJECTION, SOLUTION INFILTRATION; PERINEURAL at 09:57

## 2024-04-04 RX ADMIN — FENTANYL CITRATE 50 MCG: 50 INJECTION, SOLUTION INTRAMUSCULAR; INTRAVENOUS at 10:10

## 2024-04-04 RX ADMIN — SODIUM CHLORIDE, SODIUM LACTATE, POTASSIUM CHLORIDE, CALCIUM CHLORIDE: 600; 310; 30; 20 INJECTION, SOLUTION INTRAVENOUS at 09:57

## 2024-04-04 RX ADMIN — KETOROLAC TROMETHAMINE 30 MG: 30 INJECTION, SOLUTION INTRAMUSCULAR; INTRAVENOUS at 10:46

## 2024-04-04 RX ADMIN — SODIUM CHLORIDE, SODIUM LACTATE, POTASSIUM CHLORIDE, CALCIUM CHLORIDE: 600; 310; 30; 20 INJECTION, SOLUTION INTRAVENOUS at 09:31

## 2024-04-04 RX ADMIN — ONDANSETRON 4 MG: 2 INJECTION INTRAMUSCULAR; INTRAVENOUS at 10:09

## 2024-04-04 NOTE — OP NOTE
HOSPITAL OPERATIVE NOTE  DATE/TIME OF SURGERY: 2024  PATIENT NAME: Yue Mays  MRN: [id number]  PATIENT : 1965    Preoperative Diagnosis: HSIL    Postoperative Diagnosis: same    Surgeon: Jaz Cash DO    Procedure:  Cold Knife Cone Biopsy, ECC    Anesthesia: MAC    EBL:   15 ml    Urine output:    NA    IVF:  300  ml    Speciman:  Endocervical curettings, cone biopsy with suture at 12 o'clock    Complications:  none    Procedure: The patient was taken to the operating room where she underwent MAC anesthesia without difficulty.  She was then placed in a dorsal lithotomy position. Examination under anesthesia revealed findings as noted above.  The perineum and vagina were then prepped and draped in the usual sterile fashion.  A weighted speculum was placed in the posterior aspect of the vagina. A Richard retractor was used to visualized the cervix. 0-vicryl sutures were placed at the 3 o'clock and 9 o'clock positions for hemostasis.  The cervix was infiltrated with a dilute vasopressin solution which resulted in a blanching of the cervical bed. A cervical cone biopsy of 3 cm and centered over the os was obtained approximately 2 cm long.  It was tagged at 12 o'clock. Bleeding was controlled by electrocautery/monsels/surgicel was placed within the cone biopsy site and stay sutures were tied together with strings trimmed. All instruments were removed and the patient repositioned.  She was awakened from anesthesia and taken to PACU in stable condition. All instrument and sponge counts were correct x2.    Jaz Cash DO

## 2024-04-04 NOTE — DISCHARGE INSTRUCTIONS
Tylenol 975 mg was given at 930 am.    You should not take more then 4,000 mg of tylenol/acetaminophen in a 24 hour period.     No NSAIDS  PM.       Nothing in the vagina for 4 weeks.  May resume normal activity in 1 day  Resume normal diet as tolerated  Do not drive for 24 hours after surgery  Expect cramping and light spotting as well dark brown-black discharge for 1-2 weeks. May take Motrin and/or Tylenol as needed.  Return to the office for post-operative visit in 2 weeks.

## 2024-04-04 NOTE — ANESTHESIA POSTPROCEDURE EVALUATION
Patient: Yue Mays    Procedure: Procedure(s):  CERVICAL CONE BIOPSY       Anesthesia Type:  MAC    Note:  Disposition: Outpatient   Postop Pain Control: Uneventful            Sign Out: Well controlled pain   PONV: No   Neuro/Psych: Uneventful            Sign Out: Acceptable/Baseline neuro status   Airway/Respiratory: Uneventful            Sign Out: Acceptable/Baseline resp. status   CV/Hemodynamics: Uneventful            Sign Out: Acceptable CV status; No obvious hypovolemia; No obvious fluid overload   Other NRE: NONE   DID A NON-ROUTINE EVENT OCCUR?            Last vitals:  Vitals Value Taken Time   /57 04/04/24 1110   Temp 97  F (36.1  C) 04/04/24 1110   Pulse     Resp 16 04/04/24 1110   SpO2 95 % 04/04/24 1110       Electronically Signed By: Sb Cooper MD  April 4, 2024  12:14 PM

## 2024-04-04 NOTE — ANESTHESIA CARE TRANSFER NOTE
Patient: Yue Mays    Procedure: Procedure(s):  CERVICAL CONE BIOPSY       Diagnosis: HSIL (high grade squamous intraepithelial lesion) on Pap smear of cervix [R87.613]  Diagnosis Additional Information: No value filed.    Anesthesia Type:   MAC     Note:    Oropharynx: oropharynx clear of all foreign objects and spontaneously breathing  Level of Consciousness: drowsy  Oxygen Supplementation: room air    Independent Airway: airway patency satisfactory and stable  Dentition: dentition unchanged  Vital Signs Stable: post-procedure vital signs reviewed and stable  Report to RN Given: handoff report given  Patient transferred to: Phase II    Handoff Report: Identifed the Patient, Identified the Reponsible Provider, Reviewed the pertinent medical history, Discussed the surgical course, Reviewed Intra-OP anesthesia mangement and issues during anesthesia, Set expectations for post-procedure period and Allowed opportunity for questions and acknowledgement of understanding  Vitals:  Vitals Value Taken Time   BP     Temp     Pulse     Resp     SpO2         Electronically Signed By: IRWIN Aguirre CRNA  April 4, 2024  10:55 AM

## 2024-04-09 LAB
PATH REPORT.COMMENTS IMP SPEC: NORMAL
PATH REPORT.COMMENTS IMP SPEC: NORMAL
PATH REPORT.FINAL DX SPEC: NORMAL
PATH REPORT.GROSS SPEC: NORMAL
PATH REPORT.MICROSCOPIC SPEC OTHER STN: NORMAL
PATH REPORT.RELEVANT HX SPEC: NORMAL
PHOTO IMAGE: NORMAL

## 2024-04-29 NOTE — PATIENT INSTRUCTIONS
If you have any questions regarding your visit, Please contact your care team.    [facility]: [phone]      [facility] CLINIC HOURS TELEPHONE NUMBER   Jaz Cash DO.    KENNY Oconnor-Surgery Scheduler  Hallie - Surgery Scheduler    NAS Vazquez RN Kylie, RN     Monday, Thursday  [place]  7am-3pm    Tuesday, Wednesday  [place]  7am-3pm    Friday  [place]  1pm-3:30pm    Typical Surgery Days: Thursday or Friday   [facility]  [address] [place] [address] [phone] Phone  [phone] Fax    [facility]  [address] [place] [address] [phone] Phone    Imaging [phone] Phone    [facility]:  [phone] Phone     **Surgeries** Our Surgery Schedulers will contact you to schedule. If you do not receive a call within 3 business days, please call [phone].    Urgent Care locations:  [facility] Saturday and    9 am - 5 pm    Monday-Friday   12 pm - 8 pm  Saturday and    9 am - 5 pm   [phone]       If you need a medication refill, please contact your pharmacy. Please allow 3 business days for your refill to be completed.  As always, Thank you for trusting us with your healthcare needs!

## 2024-05-01 ENCOUNTER — OFFICE VISIT (OUTPATIENT)
Dept: OBGYN | Facility: CLINIC | Age: 59
End: 2024-05-01
Payer: COMMERCIAL

## 2024-05-01 VITALS
WEIGHT: 230.2 LBS | BODY MASS INDEX: 40.14 KG/M2 | HEART RATE: 93 BPM | DIASTOLIC BLOOD PRESSURE: 84 MMHG | SYSTOLIC BLOOD PRESSURE: 132 MMHG

## 2024-05-01 DIAGNOSIS — Z98.890 POSTOPERATIVE STATE: Primary | ICD-10-CM

## 2024-05-01 PROCEDURE — 99024 POSTOP FOLLOW-UP VISIT: CPT | Performed by: OBSTETRICS & GYNECOLOGY

## 2024-05-01 NOTE — PROGRESS NOTES
SUBJECTIVE:       HPI: Yue Mays is a 58 year old  is s/p CKC, ECC on  for HSIL. Since surgery, she has been doing well. She has had minimal vaginal bleeding. She has not been sexually active.           Today's PHQ-2 Score:       2024     8:07 AM   PHQ-2 (  [employer])   Q1: Little interest or pleasure in doing things 0   Q2: Feeling down, depressed or hopeless 0   PHQ-2 Score 0   Q1: Little interest or pleasure in doing things Not at all   Q2: Feeling down, depressed or hopeless Not at all   PHQ-2 Score 0     Today's PHQ-9 Score:       3/11/2020     6:44 AM   PHQ-9 SCORE   PHQ-9 External Data 8     Today's NAIN-7 Score:       2019     1:06 PM   NAIN-7 SCORE   Total Score 0       Problem list and histories reviewed & adjusted, as indicated.  Additional history: as documented.    Patient Active Problem List   Diagnosis    CARDIOVASCULAR SCREENING; LDL GOAL LESS THAN 160    Urinary incontinence due to urethral sphincter incompetence    Cystocele, midline    Urgency of urination    Female stress incontinence    Papanicolaou smear of cervix with low grade squamous intraepithelial lesion (LGSIL)    Depression with anxiety    Cough    BMI 40.0-44.9, adult (H)    Fatigue, unspecified type    Memory changes    Advanced directives, counseling/discussion    HSIL (high grade squamous intraepithelial lesion) on Pap smear of cervix     Past Surgical History:   Procedure Laterality Date    BIOPSY      had a 2 colposcopies for moderate displaysia    COLONOSCOPY N/A 2015    Procedure: COLONOSCOPY;  Surgeon: Arpit Tony MD;  Location:  OR    COLONOSCOPY N/A 2015    Procedure: COMBINED COLONOSCOPY, SINGLE OR MULTIPLE BIOPSY/POLYPECTOMY BY BIOPSY;  Surgeon: Arpit Tony MD;  Location:  OR    COLONOSCOPY  2015    COLONOSCOPY WITH CO2 INSUFFLATION N/A 2015    Procedure: COLONOSCOPY WITH CO2 INSUFFLATION;  Surgeon: Arpit Tony MD;  Location:   OR    COLPORRHAPHY ANTERIOR  08/21/2012    Procedure: COLPORRHAPHY ANTERIOR;  Cardinal Ligament Repair with Anterior Colporrhaphy and a Midurethral Sling;  Surgeon: Valentine Vera MD;  [facility]    COLPOSCOPY, CONIZATION, COMBINED N/A 4/4/2024    Procedure: CERVICAL CONE BIOPSY;  Surgeon: Jaz Cash DO;  Location: [facility]    EYE SURGERY      lasik both eyes  don't remember date,    GENITOURINARY SURGERY  08/2013    3 slings put in    GI SURGERY  12/31/2015    SLING TRANSVAGINAL  08/21/2012    Procedure: SLING TRANSVAGINAL;;  Surgeon: Valentine Vera MD;  [facility]    SLING TRANSVAGINAL  11/12/2012    Procedure: SLING TRANSVAGINAL;  Cysto with TVT (transobturator approach);  Surgeon: Valentine Vera MD;  Location: [facility]      Social History     Tobacco Use    Smoking status: Never    Smokeless tobacco: Never   Substance Use Topics    Alcohol use: Yes     Comment: minimal - maybe once a month      Problem (# of Occurrences) Relation (Name,Age of Onset)    Anxiety Disorder (2) Daughter (Linette Mays), Daughter (Radha Mays)    Arthritis (1) Father (Cooper Bob)    Cancer (1) Mother (January Bob): hodgkins    Depression (2) Daughter (Linette Mays), Daughter (Radha Mays)    Diabetes (2) Father (Cooper Bob): I don't recall if he had diabetes, Sister (Lexus)    Hypertension (1) Father (Cooper Bob)    Breast Cancer (1) Mother (January Bob): early 30s     Other Cancer (1) Mother (January Bob): Hodgkin's Disease, Breast Cancer    Depression/Anxiety (2) Father (Cooper Bob): undiagnosed, Sister (Lexus): undiagnosed              Current Outpatient Medications   Medication Sig Dispense Refill    albuterol (PROAIR HFA/PROVENTIL HFA/VENTOLIN HFA) 108 (90 Base) MCG/ACT inhaler INHALE 2 PUFFS INTO THE LUNGS EVERY 4 HOURS AS NEEDED FOR SHORTNESS OF BREATH, WHEEZING OR COUGH 6.7 g 1    FLOVENT  MCG/ACT inhaler INHALE 2 PUFFS INTO THE LUNGS TWICE A DAY 12 g 1     montelukast (SINGULAIR) 10 MG tablet TAKE 1 TABLET BY MOUTH EVERYDAY AT BEDTIME 90 tablet 3     No current facility-administered medications for this visit.     Allergies   Allergen Reactions    Nkda [No Known Drug Allergy]        ROS:  10 Point review of systems negative other noted above in HPI    OBJECTIVE:     /84   Pulse 93   Wt 104.4 kg (230 lb 3.2 oz)   LMP  (LMP Unknown)   BMI 40.14 kg/m    Body mass index is 40.14 kg/m .      Gen: Alert, oriented, appropriately interactive, NAD  Chest: Symmetrical, unlabored breathing        In-Clinic Test Results:  No results found for this or any previous visit (from the past 24 hour(s)).    Path  Final Diagnosis   A. Cervix, cone biopsy :  - High grade squamous intraepithelial lesion (cervical intraepithelial neoplasia 3), extending into endocervical glands, 9:00-12:00  - Surgical margins are negative for high-grade dysplasia     B. Endocervix, curettings:  -Predominantly blood with rare endocervical glandular epithelium  -Negative for malignancy   Electronically signed by Marina De Oliveira MD on 2024 at  1:15 PM       ASSESSMENT/PLAN:                                                      Yue Mays is a 58 year old  s/p CKC, ECC on  for PAVAN III; final path negative margins      ICD-10-CM    1. Postoperative state  Z98.890           Doing well postoperatively.     Cleared for routine to normal activity, including intercourse.   Next pap w/ cotesting due in 6 months. If negative, will need annual testing x3 years, followed by testing every 3 years for at least 25 years.    Return to Primary care provider for routine care, gyn as needed or for [facility]IM[facility] Cash [facility]

## 2024-05-02 ENCOUNTER — PATIENT OUTREACH (OUTPATIENT)
Dept: OBGYN | Facility: CLINIC | Age: 59
End: 2024-05-02
Payer: COMMERCIAL

## 2024-06-17 PROBLEM — Z71.89 ADVANCED DIRECTIVES, COUNSELING/DISCUSSION: Status: RESOLVED | Noted: 2022-07-29 | Resolved: 2024-06-17

## 2024-09-13 ENCOUNTER — PATIENT OUTREACH (OUTPATIENT)
Dept: FAMILY MEDICINE | Facility: CLINIC | Age: 59
End: 2024-09-13
Payer: COMMERCIAL

## 2024-11-12 ENCOUNTER — PATIENT OUTREACH (OUTPATIENT)
Dept: FAMILY MEDICINE | Facility: CLINIC | Age: 59
End: 2024-11-12
Payer: COMMERCIAL

## 2024-11-12 NOTE — TELEPHONE ENCOUNTER
Patient Quality Outreach    Patient is due for the following:   Asthma  -  ACT needed and AAP  Cervical Cancer Screening - PAP Needed      Topic Date Due    Pneumococcal Vaccine (1 of 2 - PCV) Never done    Hepatitis B Vaccine (1 of 3 - 19+ 3-dose series) Never done    Zoster (Shingles) Vaccine (1 of 2) Never done    Flu Vaccine (1) 09/01/2024    COVID-19 Vaccine (4 - 2024-25 season) 09/01/2024       Action(s) Taken:   Schedule a office visit for pap smear.  Patient was assigned appropriate questionnaire to complete    Type of outreach:    Sent [employer] message.    Questions for provider review:               TEDDY ESPINAL MA

## 2024-12-19 ENCOUNTER — PATIENT OUTREACH (OUTPATIENT)
Dept: CARE COORDINATION | Facility: CLINIC | Age: 59
End: 2024-12-19
Payer: COMMERCIAL

## 2025-01-02 ENCOUNTER — PATIENT OUTREACH (OUTPATIENT)
Dept: CARE COORDINATION | Facility: CLINIC | Age: 60
End: 2025-01-02
Payer: COMMERCIAL

## 2025-03-08 ENCOUNTER — HEALTH MAINTENANCE LETTER (OUTPATIENT)
Age: 60
End: 2025-03-08

## 2025-03-30 SDOH — HEALTH STABILITY: PHYSICAL HEALTH: ON AVERAGE, HOW MANY MINUTES DO YOU ENGAGE IN EXERCISE AT THIS LEVEL?: 10 MIN

## 2025-03-30 SDOH — HEALTH STABILITY: PHYSICAL HEALTH: ON AVERAGE, HOW MANY DAYS PER WEEK DO YOU ENGAGE IN MODERATE TO STRENUOUS EXERCISE (LIKE A BRISK WALK)?: 0 DAYS

## 2025-03-30 ASSESSMENT — SOCIAL DETERMINANTS OF HEALTH (SDOH): HOW OFTEN DO YOU GET TOGETHER WITH FRIENDS OR RELATIVES?: TWICE A WEEK

## 2025-03-30 ASSESSMENT — ASTHMA QUESTIONNAIRES
QUESTION_3 LAST FOUR WEEKS HOW OFTEN DID YOUR ASTHMA SYMPTOMS (WHEEZING, COUGHING, SHORTNESS OF BREATH, CHEST TIGHTNESS OR PAIN) WAKE YOU UP AT NIGHT OR EARLIER THAN USUAL IN THE MORNING: NOT AT ALL
QUESTION_4 LAST FOUR WEEKS HOW OFTEN HAVE YOU USED YOUR RESCUE INHALER OR NEBULIZER MEDICATION (SUCH AS ALBUTEROL): THREE OR MORE TIMES PER DAY
QUESTION_5 LAST FOUR WEEKS HOW WOULD YOU RATE YOUR ASTHMA CONTROL: POORLY CONTROLLED
QUESTION_1 LAST FOUR WEEKS HOW MUCH OF THE TIME DID YOUR ASTHMA KEEP YOU FROM GETTING AS MUCH DONE AT WORK, SCHOOL OR AT HOME: NONE OF THE TIME
ACT_TOTALSCORE: 18
QUESTION_2 LAST FOUR WEEKS HOW OFTEN HAVE YOU HAD SHORTNESS OF BREATH: NOT AT ALL

## 2025-04-01 ENCOUNTER — OFFICE VISIT (OUTPATIENT)
Dept: FAMILY MEDICINE | Facility: CLINIC | Age: 60
End: 2025-04-01
Attending: NURSE PRACTITIONER
Payer: COMMERCIAL

## 2025-04-01 VITALS
DIASTOLIC BLOOD PRESSURE: 77 MMHG | WEIGHT: 233.4 LBS | OXYGEN SATURATION: 96 % | BODY MASS INDEX: 39.85 KG/M2 | SYSTOLIC BLOOD PRESSURE: 139 MMHG | TEMPERATURE: 97.2 F | HEIGHT: 64 IN | RESPIRATION RATE: 20 BRPM | HEART RATE: 74 BPM

## 2025-04-01 DIAGNOSIS — Z12.11 SCREEN FOR COLON CANCER: ICD-10-CM

## 2025-04-01 DIAGNOSIS — Z12.4 CERVICAL CANCER SCREENING: ICD-10-CM

## 2025-04-01 DIAGNOSIS — Z00.00 ROUTINE GENERAL MEDICAL EXAMINATION AT A HEALTH CARE FACILITY: Primary | ICD-10-CM

## 2025-04-01 DIAGNOSIS — Z13.6 CARDIOVASCULAR SCREENING; LDL GOAL LESS THAN 160: ICD-10-CM

## 2025-04-01 DIAGNOSIS — R05.3 CHRONIC COUGH: ICD-10-CM

## 2025-04-01 DIAGNOSIS — Z13.1 SCREENING FOR DIABETES MELLITUS: ICD-10-CM

## 2025-04-01 DIAGNOSIS — Z12.31 ENCOUNTER FOR SCREENING MAMMOGRAM FOR BREAST CANCER: ICD-10-CM

## 2025-04-01 LAB
ANION GAP SERPL CALCULATED.3IONS-SCNC: 9 MMOL/L (ref 7–15)
BUN SERPL-MCNC: 15 MG/DL (ref 8–23)
CALCIUM SERPL-MCNC: 9.7 MG/DL (ref 8.8–10.4)
CHLORIDE SERPL-SCNC: 105 MMOL/L (ref 98–107)
CHOLEST SERPL-MCNC: 200 MG/DL
CREAT SERPL-MCNC: 0.64 MG/DL (ref 0.51–0.95)
EGFRCR SERPLBLD CKD-EPI 2021: >90 ML/MIN/1.73M2
EST. AVERAGE GLUCOSE BLD GHB EST-MCNC: 105 MG/DL
FASTING STATUS PATIENT QL REPORTED: YES
FASTING STATUS PATIENT QL REPORTED: YES
GLUCOSE SERPL-MCNC: 92 MG/DL (ref 70–99)
HBA1C MFR BLD: 5.3 % (ref 0–5.6)
HCO3 SERPL-SCNC: 27 MMOL/L (ref 22–29)
HDLC SERPL-MCNC: 53 MG/DL
LDLC SERPL CALC-MCNC: 116 MG/DL
NONHDLC SERPL-MCNC: 147 MG/DL
POTASSIUM SERPL-SCNC: 4.6 MMOL/L (ref 3.4–5.3)
SODIUM SERPL-SCNC: 141 MMOL/L (ref 135–145)
TRIGL SERPL-MCNC: 154 MG/DL

## 2025-04-01 PROCEDURE — 87624 HPV HI-RISK TYP POOLED RSLT: CPT | Performed by: NURSE PRACTITIONER

## 2025-04-01 PROCEDURE — 80048 BASIC METABOLIC PNL TOTAL CA: CPT | Performed by: NURSE PRACTITIONER

## 2025-04-01 PROCEDURE — 83036 HEMOGLOBIN GLYCOSYLATED A1C: CPT | Performed by: NURSE PRACTITIONER

## 2025-04-01 PROCEDURE — 99213 OFFICE O/P EST LOW 20 MIN: CPT | Mod: 25 | Performed by: NURSE PRACTITIONER

## 2025-04-01 PROCEDURE — 36415 COLL VENOUS BLD VENIPUNCTURE: CPT | Performed by: NURSE PRACTITIONER

## 2025-04-01 PROCEDURE — 3075F SYST BP GE 130 - 139MM HG: CPT | Performed by: NURSE PRACTITIONER

## 2025-04-01 PROCEDURE — G0145 SCR C/V CYTO,THINLAYER,RESCR: HCPCS | Performed by: NURSE PRACTITIONER

## 2025-04-01 PROCEDURE — 1126F AMNT PAIN NOTED NONE PRSNT: CPT | Performed by: NURSE PRACTITIONER

## 2025-04-01 PROCEDURE — 80061 LIPID PANEL: CPT | Performed by: NURSE PRACTITIONER

## 2025-04-01 PROCEDURE — 3078F DIAST BP <80 MM HG: CPT | Performed by: NURSE PRACTITIONER

## 2025-04-01 PROCEDURE — 99396 PREV VISIT EST AGE 40-64: CPT | Performed by: NURSE PRACTITIONER

## 2025-04-01 RX ORDER — OMEPRAZOLE 40 MG/1
40 CAPSULE, DELAYED RELEASE ORAL DAILY
Qty: 90 CAPSULE | Refills: 1 | Status: SHIPPED | OUTPATIENT
Start: 2025-04-01

## 2025-04-01 RX ORDER — FLUTICASONE PROPIONATE 220 UG/1
2 AEROSOL, METERED RESPIRATORY (INHALATION) 2 TIMES DAILY
Qty: 12 G | Refills: 11 | Status: SHIPPED | OUTPATIENT
Start: 2025-04-01

## 2025-04-01 RX ORDER — MONTELUKAST SODIUM 10 MG/1
1 TABLET ORAL AT BEDTIME
Qty: 90 TABLET | Refills: 3 | Status: SHIPPED | OUTPATIENT
Start: 2025-04-01

## 2025-04-01 RX ORDER — ALBUTEROL SULFATE 90 UG/1
1-2 INHALANT RESPIRATORY (INHALATION) EVERY 4 HOURS PRN
Qty: 8 G | Refills: 11 | Status: SHIPPED | OUTPATIENT
Start: 2025-04-01

## 2025-04-01 ASSESSMENT — PAIN SCALES - GENERAL: PAINLEVEL_OUTOF10: NO PAIN (0)

## 2025-04-01 NOTE — PROGRESS NOTES
"Preventive Care Visit  [facility]  CharleeIRWIN Keith CNP, Family Medicine  Apr 1, 2025      Assessment & Plan     Routine general medical examination at a health care facility  -next preventative care visit in one year.     - PRIMARY CARE FOLLOW-UP SCHEDULING    Cervical cancer screening    - HPV and Gynecologic Cytology Panel - Recommended Age 30 - 65 Years  - Gynecologic Cytology (PAP)    Chronic cough  -unsure cause, possible post nasal drip, reactive airway disease, LPR, allergic rhinitis.  - fluticasone (FLOVENT HFA) 220 MCG/ACT inhaler; Inhale 2 puffs into the lungs 2 times daily.  - albuterol (PROAIR HFA/PROVENTIL HFA/VENTOLIN HFA) 108 (90 Base) MCG/ACT inhaler; Inhale 1-2 puffs into the lungs every 4 hours as needed for shortness of breath, wheezing or cough.  - montelukast (SINGULAIR) 10 MG tablet; Take 1 tablet (10 mg) by mouth at bedtime.  - omeprazole (PRILOSEC) 40 MG DR capsule; Take 1 capsule (40 mg) by mouth daily.    Screen for colon cancer    - Colonoscopy Screening  Referral; Future    Encounter for screening mammogram for breast cancer    - MA Screening Bilateral w/ [place]; Future    Screening for diabetes mellitus    - Basic metabolic panel  - Hemoglobin A1c    CARDIOVASCULAR SCREENING; LDL GOAL LESS THAN 160    - Lipid panel reflex to direct LDL Fasting            BMI  Estimated body mass index is 40.7 kg/m  as calculated from the following:    Height as of this encounter: 1.613 m (5' 3.5\").    Weight as of this encounter: 105.9 kg (233 lb 6.4 oz).       Counseling  Appropriate preventive services were addressed with this patient via screening, questionnaire, or discussion as appropriate for fall prevention, nutrition, physical activity, Tobacco-use cessation, social engagement, weight loss and cognition.  Checklist reviewing preventive services available has been given to the patient.  Reviewed patient's diet, addressing concerns and/or questions. "           Sher Turner is a 59 year old, presenting for the following:  Physical        4/1/2025     8:39 AM   Additional Questions   Roomed by Lety KEE   Accompanied by self     Patient here for yearly preventative care visit. In addition, they have the following concerns:    1. Persistent cough over past few weeks. Lisbeth diagnosed with asthma. No wheezing, no waking up at night coughing. Unsure if albuterol helps.         Fasting visit    Advance Care Planning  Patient does not have a Health Care Directive: Discussed advance care planning with patient; however, patient declined at this time.      3/30/2025   General Health   How would you rate your overall physical health? Good   Feel stress (tense, anxious, or unable to sleep) Not at all         3/30/2025   Nutrition   Three or more servings of calcium each day? (!) NO   Diet: Regular (no restrictions)   How many servings of fruit and vegetables per day? (!) 2-3   How many sweetened beverages each day? 0-1         3/30/2025   Exercise   Days per week of moderate/strenous exercise 0 days   Average minutes spent exercising at this level 10 min   (!) EXERCISE CONCERN      3/30/2025   Social Factors   Frequency of gathering with friends or relatives Twice a week   Worry food won't last until get money to buy more No   Food not last or not have enough money for food? No   Do you have housing? (Housing is defined as stable permanent housing and does not include staying ouside in a car, in a tent, in an abandoned building, in an overnight shelter, or couch-surfing.) Yes   Are you worried about losing your housing? No   Lack of transportation? No   Unable to get utilities (heat,electricity)? No         3/30/2025   Fall Risk   Fallen 2 or more times in the past year? No   Trouble with walking or balance? No          3/30/2025   Dental   Dentist two times every year? Yes           Today's PHQ-2 Score:       3/31/2025     9:40 PM   PHQ-2 ( 1999 [employer])   Q1: Little  interest or pleasure in doing things 0   Q2: Feeling down, depressed or hopeless 0   PHQ-2 Score 0    Q1: Little interest or pleasure in doing things Not at all   Q2: Feeling down, depressed or hopeless Not at all   PHQ-2 Score 0       Patient-reported           3/30/2025   Substance Use   Alcohol more than 3/day or more than 7/wk No   Do you use any other substances recreationally? No     Social History     Tobacco Use    Smoking status: Never    Smokeless tobacco: Never   Vaping Use    Vaping status: Never Used   Substance Use Topics    Alcohol use: Yes     Comment: minimal - maybe once a month    Drug use: No         3/18/2024   LAST FHS-7 RESULTS   1st degree relative breast or ovarian cancer Yes   Any relative bilateral breast cancer No   Any male have breast cancer No   Any ONE woman have BOTH breast AND ovarian cancer No   Any woman with breast cancer before 50yrs Yes   2 or more relatives with breast AND/OR ovarian cancer No   2 or more relatives with breast AND/OR bowel cancer No        Mammogram Screening - Mammogram every 1-2 years updated in Health Maintenance based on mutual decision making        3/30/2025   STI Screening   New sexual partner(s) since last STI/HIV test? No     History of abnormal Pap smear: YES - reflected in Problem List and Health Maintenance accordingly        Latest Ref Rng & Units 1/18/2024     8:42 AM 8/29/2019     2:00 PM 8/29/2019     1:37 PM   PAP / HPV   PAP  High-grade squamous intraepithelial lesion (HSIL) encompassing mod/severe dysplasia, CIS, CIN2, CIN3      PAP (Historical)    LSIL    HPV 16 DNA Negative Negative  Negative     HPV 18 DNA Negative Negative  Negative     Other HR HPV Negative Positive  Positive       ASCVD Risk   The 10-year ASCVD risk score (Thee RICHARD, et al., 2019) is: 3.6%    Values used to calculate the score:      Age: 59 years      Sex: Female      Is Non- : No      Diabetic: No      Tobacco smoker: No      Systolic  "Blood Pressure: 139 mmHg      Is BP treated: No      HDL Cholesterol: 53 mg/dL      Total Cholesterol: 200 mg/dL           Reviewed and updated as needed this visit by Provider   Tobacco  Allergies  Meds  Problems  Med Hx  Surg Hx  Fam Hx  Soc   Hx Sexual Activity                   Objective    Exam  /77   Pulse 74   Temp 97.2  F (36.2  C) (Tympanic)   Resp 20   Ht 1.613 m (5' 3.5\")   Wt 105.9 kg (233 lb 6.4 oz)   LMP  (LMP Unknown)   SpO2 96%   BMI 40.70 kg/m     Estimated body mass index is 40.7 kg/m  as calculated from the following:    Height as of this encounter: 1.613 m (5' 3.5\").    Weight as of this encounter: 105.9 kg (233 lb 6.4 oz).    Physical Exam  Constitutional:       Appearance: Normal appearance. She is not ill-appearing.   HENT:      Right Ear: Tympanic membrane, ear canal and external ear normal.      Left Ear: Tympanic membrane, ear canal and external ear normal.      Nose: Nose normal. No congestion.      Mouth/Throat:      Mouth: Mucous membranes are moist.      Pharynx: Oropharynx is clear.   Eyes:      Pupils: Pupils are equal, round, and reactive to light.   Cardiovascular:      Rate and Rhythm: Normal rate and regular rhythm.      Pulses: Normal pulses.      Heart sounds: Normal heart sounds. No murmur heard.     No friction rub. No gallop.   Pulmonary:      Effort: Pulmonary effort is normal.      Breath sounds: Normal breath sounds.   Abdominal:      General: Abdomen is flat. Bowel sounds are normal.      Palpations: Abdomen is soft.   Genitourinary:     General: Normal vulva.      Labia:         Right: No rash, tenderness, lesion or injury.         Left: No rash, tenderness, lesion or injury.       Vagina: Normal. No vaginal discharge.      Cervix: Normal.   Musculoskeletal:         General: Normal range of motion.      Cervical back: Normal range of motion.   Lymphadenopathy:      Cervical: No cervical adenopathy.   Skin:     General: Skin is warm and dry. "   Neurological:      General: No focal deficit present.      Mental Status: She is alert and oriented to person, place, and time.   Psychiatric:         Mood and Affect: Mood normal.         Behavior: Behavior normal.         Thought Content: Thought content normal.         Judgment: Judgment normal.               Signed Electronically by: IRWIN Oliveros CNP

## 2025-04-01 NOTE — PATIENT INSTRUCTIONS
Patient Education   Preventive Care Advice   This is general advice given by our system to help you stay healthy. However, your care team may have specific advice just for you. Please talk to your care team about your preventive care needs.  Nutrition  Eat 5 or more servings of fruits and vegetables each day.  Try wheat bread, brown rice and whole grain pasta (instead of white bread, rice, and pasta).  Get enough calcium and vitamin D. Check the label on foods and aim for 100% of the RDA (recommended daily allowance).  Lifestyle  Exercise at least 150 minutes each week  (30 minutes a day, 5 days a week).  Do muscle strengthening activities 2 days a week. These help control your weight and prevent disease.  No smoking.  Wear sunscreen to prevent skin cancer.  Have a dental exam and cleaning every 6 months.  Yearly exams  See your health care team every year to talk about:  Any changes in your health.  Any medicines your care team has prescribed.  Preventive care, family planning, and ways to prevent chronic diseases.  Shots (vaccines)   HPV shots (up to age 26), if you've never had them before.  Hepatitis B shots (up to age 59), if you've never had them before.  COVID-19 shot: Get this shot when it's due.  Flu shot: Get a flu shot every year.  Tetanus shot: Get a tetanus shot every 10 years.  Pneumococcal, hepatitis A, and RSV shots: Ask your care team if you need these based on your risk.  Shingles shot (for age 50 and up)  General health tests  Diabetes screening:  Starting at age 35, Get screened for diabetes at least every 3 years.  If you are younger than age 35, ask your care team if you should be screened for diabetes.  Cholesterol test: At age 39, start having a cholesterol test every 5 years, or more often if advised.  Bone density scan (DEXA): At age 50, ask your care team if you should have this scan for osteoporosis (brittle bones).  Hepatitis C: Get tested at least once in your life.  STIs (sexually  transmitted infections)  Before age 24: Ask your care team if you should be screened for STIs.  After age 24: Get screened for STIs if you're at risk. You are at risk for STIs (including HIV) if:  You are sexually active with more than one person.  You don't use condoms every time.  You or a partner was diagnosed with a sexually transmitted infection.  If you are at risk for HIV, ask about PrEP medicine to prevent HIV.  Get tested for HIV at least once in your life, whether you are at risk for HIV or not.  Cancer screening tests  Cervical cancer screening: If you have a cervix, begin getting regular cervical cancer screening tests starting at age 21.  Breast cancer scan (mammogram): If you've ever had breasts, begin having regular mammograms starting at age 40. This is a scan to check for breast cancer.  Colon cancer screening: It is important to start screening for colon cancer at age 45.  Have a colonoscopy test every 10 years (or more often if you're at risk) Or, ask your provider about stool tests like a FIT test every year or Cologuard test every 3 years.  To learn more about your testing options, visit:   .  For help making a decision, visit:   https://bit.ly/qe65571.  Prostate cancer screening test: If you have a prostate, ask your care team if a prostate cancer screening test (PSA) at age 55 is right for you.  Lung cancer screening: If you are a current or former smoker ages 50 to 80, ask your care team if ongoing lung cancer screenings are right for you.  For informational purposes only. Not to replace the advice of your health care provider. Copyright   2023 [employer]. All rights reserved. Clinically reviewed by the [facility] Transitions Program. [employer] 225640 - REV 01/24.

## 2025-04-02 ENCOUNTER — PATIENT OUTREACH (OUTPATIENT)
Dept: CARE COORDINATION | Facility: CLINIC | Age: 60
End: 2025-04-02
Payer: COMMERCIAL

## 2025-04-03 ENCOUNTER — PATIENT OUTREACH (OUTPATIENT)
Dept: FAMILY MEDICINE | Facility: CLINIC | Age: 60
End: 2025-04-03
Payer: COMMERCIAL

## 2025-04-03 DIAGNOSIS — R87.612 PAPANICOLAOU SMEAR OF CERVIX WITH LOW GRADE SQUAMOUS INTRAEPITHELIAL LESION (LGSIL): Primary | ICD-10-CM

## 2025-04-03 LAB
BKR AP ASSOCIATED HPV REPORT: NORMAL
BKR LAB AP GYN ADEQUACY: NORMAL
BKR LAB AP GYN INTERPRETATION: NORMAL
BKR LAB AP PREVIOUS ABNL DX: NORMAL
BKR LAB AP PREVIOUS ABNORMAL: NORMAL
HPV HR 12 DNA CVX QL NAA+PROBE: NEGATIVE
HPV16 DNA CVX QL NAA+PROBE: POSITIVE
HPV18 DNA CVX QL NAA+PROBE: NEGATIVE
HUMAN PAPILLOMA VIRUS FINAL DIAGNOSIS: ABNORMAL
PATH REPORT.COMMENTS IMP SPEC: NORMAL
PATH REPORT.COMMENTS IMP SPEC: NORMAL
PATH REPORT.RELEVANT HX SPEC: NORMAL

## 2025-05-22 ENCOUNTER — E-VISIT (OUTPATIENT)
Dept: URGENT CARE | Facility: CLINIC | Age: 60
End: 2025-05-22
Payer: COMMERCIAL

## 2025-05-22 ENCOUNTER — NURSE TRIAGE (OUTPATIENT)
Dept: FAMILY MEDICINE | Facility: CLINIC | Age: 60
End: 2025-05-22

## 2025-05-22 DIAGNOSIS — J02.9 SORE THROAT: Primary | ICD-10-CM

## 2025-05-22 NOTE — TELEPHONE ENCOUNTER
"Pt calling into the clinic with reports of a severe sore throat, pain in ears, coughing up lots of mucus and a headache. Pt reports throat pain is 10/10. See triage questions below for additional information. Disposition is for pt to be seen in office or video visit today. No appointments in clinic today. Advised to be seen in UC or submit E-visit to PCP. Pt stated she would try to submit E-Visit. Pt also has appointment with Chuy Vergara PA-C tomorrow 5/23. Provided care advise per protocol. Advised on s/s of when to call back to the clinic and when to call 911. Pt verbalized understanding. No additional questions at this time.     Thank you - ADILIA Tate, RN    Reason for Disposition   SEVERE sore throat pain    Additional Information   Negative: SEVERE difficulty breathing (e.g., struggling for each breath, speaks in single words)   Negative: Sounds like a life-threatening emergency to the triager   Negative: Throat culture results, call about   Negative: Productive cough is main symptom   Negative: Runny nose is main symptom   Negative: Drooling or spitting out saliva (because can't swallow)   Negative: Unable to open mouth completely   Negative: Drinking very little and has signs of dehydration (e.g., no urine > 12 hours, very dry mouth, very lightheaded)   Negative: Patient sounds very sick or weak to the triager   Negative: Difficulty breathing (per caller) but not severe   Negative: Fever > 103 F (39.4 C)   Negative: Refuses to drink anything for > 12 hours    Answer Assessment - Initial Assessment Questions  1. ONSET: \"When did the throat start hurting?\" (Hours or days ago)       Monday and has been getting progressively worse  2. SEVERITY: \"How bad is the sore throat?\" (Scale 1-10; mild, moderate or severe)      10  3. STREP EXPOSURE: \"Has there been any exposure to strep within the past week?\" If Yes, ask: \"What type of contact occurred?\"       No  4.  VIRAL SYMPTOMS: \"Are there any symptoms of a " "cold, such as a runny nose, cough, hoarse voice or red eyes?\"       Yes, hoarse voice & cough  5. FEVER: \"Do you have a fever?\" If Yes, ask: \"What is your temperature, how was it measured, and when did it start?\"      Unsure but does not think so  6. PUS ON THE TONSILS: \"Is there pus on the tonsils in the back of your throat?\"      No  7. OTHER SYMPTOMS: \"Do you have any other symptoms?\" (e.g., difficulty breathing, headache, rash)      Headache that is not relieved by ibuprofen.  8. PREGNANCY: \"Is there any chance you are pregnant?\" \"When was your last menstrual period?\"      N/A    Protocols used: Sore Throat-A-OH    "

## 2025-05-22 NOTE — PATIENT INSTRUCTIONS
Dear Yue Mays,    We are sorry you are not feeling well. Based on the responses you provided, it is recommended that you be seen in-person in urgent care so we can better evaluate your symptoms. Please click here to find the nearest urgent care location to you.   You will not be charged for this Visit. Thank you for trusting us with your care.    IRWIN Cobb CNP

## 2025-06-04 ENCOUNTER — OFFICE VISIT (OUTPATIENT)
Dept: OBGYN | Facility: CLINIC | Age: 60
End: 2025-06-04
Payer: COMMERCIAL

## 2025-06-04 VITALS
WEIGHT: 234.2 LBS | DIASTOLIC BLOOD PRESSURE: 81 MMHG | HEART RATE: 58 BPM | BODY MASS INDEX: 40.84 KG/M2 | SYSTOLIC BLOOD PRESSURE: 124 MMHG

## 2025-06-04 DIAGNOSIS — B97.7 HIGH RISK HPV INFECTION: Primary | ICD-10-CM

## 2025-06-04 PROCEDURE — 3074F SYST BP LT 130 MM HG: CPT | Performed by: OBSTETRICS & GYNECOLOGY

## 2025-06-04 PROCEDURE — 57456 ENDOCERV CURETTAGE W/SCOPE: CPT | Performed by: OBSTETRICS & GYNECOLOGY

## 2025-06-04 PROCEDURE — 3079F DIAST BP 80-89 MM HG: CPT | Performed by: OBSTETRICS & GYNECOLOGY

## 2025-06-04 NOTE — PROGRESS NOTES
Subjective  Yue Mays is a 60 year old female here for a colposcopy.  She was referred to me by Charlee Ga.    Her last pap smear:    1/20/15 LSIL, + HR HPV (Not 16/18). Plan colp   2/16/15 [place] LSIL. Plan cotest in 1 year.   6/16/16 ASCUS Pap, + HR HPV (Not 16/18). Plan colp   7/11/16 [place] ECC - negative. Plan cotest in 1 year.   11/20/17 LSIL Pap, + HR HPV (Not 16/18) with endometrial cells. Plan colp   3/5/18 3 month [place] not done, updated to 6mo [place]/Pap due by 5/20/18 6/20/18 [place] ECC PAVAN 1, LSIL Pap, + HR HPV (Neg 16/18). Plan cotest in 1 year.   7/24/19 Lost to follow-up for pap tracking   8/29/19 LSIL pap, + HR HPV (not 16 or 18). Plan colp.   10/9/19 [place] ECC with CIN1, Plan: cotest in one year   3/3/21 Lost to follow-up for pap tracking   1/18/24 HSIL, +HR HPV (not 16/18). Plan: LEEP/treatment due before 4/18/24 per provider   4/4/24 [facility] PAVAN 3, margins neg, ECC neg. Plan: cotest in 6 months   4/1/25 NIL pap, +HPV 16. Plan [place] bef 7/1/25         Tobacco: No      Gardasil vaccination status:No  No LMP recorded (lmp unknown). Patient is postmenopausal.  I discussed the history of HPV and the risk of dysplasia/cancer.  I explained the indications for the colposcopy and the procedure in detail.  I discussed the potential risks including bleeding, infection and cramping. I have recommend abstinence for 1 week and no vigorous activity for 48 hours.  Consent form was signed by patient and all questions were answered.    OBJECTIVE:  Vitals:    06/04/25 0809   BP: 124/81   Pulse: 58   Weight: 106.2 kg (234 lb 3.2 oz)      Patient alert, oriented, and in no acute distress. She was placed on the exam table in the dorsal position and a sterile speculum inserted into the vagina. The cervix was easily visualized.  Acetic acid was applied to better visualize the transformation zone.  Lugol's solution was then applied. Colposcopy was performed in the usual fashion.  No biopsy taken, cervix normal, and an  ECC was performed. See procedure note and exam for further details.    ASSESSMENT:    Colposcopy of the cervix and upper/adjacent vagina with ECC.       HR HPV pap smear.    PLAN:   Will await pathology results, if CIN1, recommend Repeat pap in 12 months    Discussed symptoms to watch for, including bleeding through 1 pad or more per hour, heavy cramps or abdominal pain, fever, or purulent foul smelling discharge.  If these occur, then she will call or return for follow up.    Jaz Cash DO    Physical Exam   GYN: Colposcopy/LEEP    Date/Time: 6/4/2025 8:12 AM    Performed by: Jaz Cash DO  Authorized by: Jaz Cash DO    Consent:     Consent obtained:  Written    Consent given by:  Patient    Procedural risks discussed:  Bleeding, damage to other organs, repeat procedure and infection    Patient questions answered: yes      Patient agrees, verbalizes understanding, and wants to proceed: yes      Educational handouts given: yes      Instructions and paperwork completed: yes    Universal protocol:     Patient states understanding of procedure being performed: yes      Relevant documents present and verified: yes      Test results available and properly labeled: yes      Required blood products, implants, devices, and special equipment available: yes    Pre-procedure:     Pre-procedure timeout performed: yes      Prepped with: acetic acid    Indication:     Indication:  HPV    HPV Indications:  Other high risk  Procedure:     Procedure: Colposcopy w/ cervical biopsy and ECC      Under satisfactory analgesia the patient was prepped and draped in the dorsal lithotomy position: yes      Ventura speculum was placed in the vagina: yes      Under colposcopic examination the transition zone was seen in entirety: yes      Endocervix was curetted using a Kevorkian curette: yes      Cervical biopsy performed with a cervical biopsy punch: no      Specimen to pathology: yes    Post-procedure:     Findings  comment:  S/p leep, atrophic    Patient tolerance of procedure:  Patient tolerated the procedure well with no immediate complications

## 2025-06-10 ENCOUNTER — RESULTS FOLLOW-UP (OUTPATIENT)
Dept: OBGYN | Facility: CLINIC | Age: 60
End: 2025-06-10

## 2025-06-12 ENCOUNTER — ANCILLARY PROCEDURE (OUTPATIENT)
Dept: GENERAL RADIOLOGY | Facility: CLINIC | Age: 60
End: 2025-06-12
Attending: INTERNAL MEDICINE
Payer: COMMERCIAL

## 2025-06-12 ENCOUNTER — OFFICE VISIT (OUTPATIENT)
Dept: FAMILY MEDICINE | Facility: CLINIC | Age: 60
End: 2025-06-12
Payer: COMMERCIAL

## 2025-06-12 VITALS
BODY MASS INDEX: 39.78 KG/M2 | HEART RATE: 81 BPM | RESPIRATION RATE: 16 BRPM | WEIGHT: 233 LBS | OXYGEN SATURATION: 97 % | TEMPERATURE: 98.2 F | HEIGHT: 64 IN | SYSTOLIC BLOOD PRESSURE: 120 MMHG | DIASTOLIC BLOOD PRESSURE: 81 MMHG

## 2025-06-12 DIAGNOSIS — J18.9 COMMUNITY ACQUIRED PNEUMONIA OF RIGHT LUNG, UNSPECIFIED PART OF LUNG: ICD-10-CM

## 2025-06-12 DIAGNOSIS — R05.2 SUBACUTE COUGH: Primary | ICD-10-CM

## 2025-06-12 DIAGNOSIS — R05.2 SUBACUTE COUGH: ICD-10-CM

## 2025-06-12 RX ORDER — PREDNISONE 20 MG/1
TABLET ORAL
Qty: 7 TABLET | Refills: 0 | Status: SHIPPED | OUTPATIENT
Start: 2025-06-12

## 2025-06-12 RX ORDER — FEXOFENADINE HCL 60 MG/1
60 TABLET, FILM COATED ORAL 2 TIMES DAILY
COMMUNITY
Start: 2025-06-12

## 2025-06-12 RX ORDER — FLUTICASONE PROPIONATE 50 MCG
1 SPRAY, SUSPENSION (ML) NASAL DAILY
Qty: 18 ML | Refills: 4 | Status: SHIPPED | OUTPATIENT
Start: 2025-06-12

## 2025-06-12 RX ORDER — LEVOFLOXACIN 750 MG/1
750 TABLET, FILM COATED ORAL DAILY
Qty: 10 TABLET | Refills: 0 | Status: SHIPPED | OUTPATIENT
Start: 2025-06-12 | End: 2025-06-22

## 2025-06-12 ASSESSMENT — ASTHMA QUESTIONNAIRES
QUESTION_5 LAST FOUR WEEKS HOW WOULD YOU RATE YOUR ASTHMA CONTROL: COMPLETELY CONTROLLED
QUESTION_1 LAST FOUR WEEKS HOW MUCH OF THE TIME DID YOUR ASTHMA KEEP YOU FROM GETTING AS MUCH DONE AT WORK, SCHOOL OR AT HOME: NONE OF THE TIME
QUESTION_3 LAST FOUR WEEKS HOW OFTEN DID YOUR ASTHMA SYMPTOMS (WHEEZING, COUGHING, SHORTNESS OF BREATH, CHEST TIGHTNESS OR PAIN) WAKE YOU UP AT NIGHT OR EARLIER THAN USUAL IN THE MORNING: NOT AT ALL
QUESTION_4 LAST FOUR WEEKS HOW OFTEN HAVE YOU USED YOUR RESCUE INHALER OR NEBULIZER MEDICATION (SUCH AS ALBUTEROL): TWO OR THREE TIMES PER WEEK
QUESTION_2 LAST FOUR WEEKS HOW OFTEN HAVE YOU HAD SHORTNESS OF BREATH: NOT AT ALL
ACT_TOTALSCORE: 23

## 2025-06-12 ASSESSMENT — PAIN SCALES - GENERAL: PAINLEVEL_OUTOF10: NO PAIN (0)

## 2025-06-12 ASSESSMENT — ENCOUNTER SYMPTOMS: COUGH: 1

## 2025-06-12 NOTE — PROGRESS NOTES
"  Assessment & Plan     (R05.2) Subacute cough  (primary encounter diagnosis)  Comment: She has a subacute bordering on chronic cough.  She is bringing up some mucus that is unremarkable.  She is not having chest pain.  She has not had any symptoms suggestive of CHF.  Her previous chest x-ray did show some pulmonary edema.  We talked about trying to determine what is irritating her lungs.  She does not feel like she has reflux.  She has not noticed any change in her symptoms with omeprazole.  She does have allergies.  Plan: Will try treating her for allergies with Allegra, Flonase, and a short course of prednisone.  I do not see indication for antibiotics at this time.  If the radiologist feels that there is pulmonary edema once again, then I think she needs an echocardiogram.  Will have her follow-up if she is not improving in 7 to 10 da    CAP  Comment: Radiology felt that there was a right sided pneumonia in the early stages.  I think her symptoms are compatible with pneumonia.  Plan: Will continue with the allergy medicines.  Will add levofloxacin 750 mg daily for 10 days.  Will have her follow-up in 7 to 10 days if she is not improving.  Will have her follow-up in 6 to 8 weeks for repeat chest x-ray      BMI  Estimated body mass index is 40.63 kg/m  as calculated from the following:    Height as of this encounter: 1.613 m (5' 3.5\").    Weight as of this encounter: 105.7 kg (233 lb).       Sher Turner is a 60 year old, presenting for the following health issues:  Cough        6/12/2025     7:38 AM   Additional Questions   Roomed by Michelle Martinez MA   Accompanied by Self     Cough    History of Present Illness       Reason for visit:  Lingering cold, 4th week    She eats 2-3 servings of fruits and vegetables daily.She consumes 0 sweetened beverage(s) daily.She exercises with enough effort to increase her heart rate 9 or less minutes per day.  She exercises with enough effort to increase her heart rate 3 or " "less days per week.   She is taking medications regularly.    6-year-old female presents with a history of cough.  It has been going on for several months.  She had a chest x-ray on 4/12/2025 that was unremarkable.  She was seen in urgent care at that time.  She had another chest x-ray on 5/26/2025 that showed moderate pulmonary edema, but was otherwise unremarkable.  She is noting a lot of mucus production.  She thinks that it is coming from her sinuses and allergies.  She was placed on omeprazole for possible reflux.  She has been on 40 mg daily and is continuing on that.  She does not feel like she has problems with heartburn.  She is on Flovent inhalers.  She has not had any chest pain.  She has not had problems with pedal edema.  She has not had any PND or orthopnea.        Objective    /81   Pulse 81   Temp 98.2  F (36.8  C) (Tympanic)   Resp 16   Ht 1.613 m (5' 3.5\")   Wt 105.7 kg (233 lb)   LMP  (LMP Unknown)   SpO2 97%   BMI 40.63 kg/m    Body mass index is 40.63 kg/m .  Physical Exam   GENERAL: alert and no distress  EYES: Eyes grossly normal to inspection, PERRL and conjunctivae and sclerae normal  HENT: ear canals and TM's normal, nose and mouth without ulcers or lesions  NECK: no adenopathy, no asymmetry, masses, or scars  RESP: lungs clear to auscultation - no rales, rhonchi or wheezes  CV: regular rate and rhythm, normal S1 S2, no S3 or S4, no murmur, click or rub, no peripheral edema  ABDOMEN: soft, nontender, no hepatosplenomegaly, no masses and bowel sounds normal  MS: no gross musculoskeletal defects noted, no edema  NEURO: Normal strength and tone, mentation intact and speech normal  PSYCH: mentation appears normal, affect normal/bright    CXR - Reviewed and interpreted by me her chest x-ray shows some prominent vessels.  There is no pleural effusions.  No infiltrate.        Signed Electronically by: Alejandro Cabrajal MD      "

## 2025-06-26 ENCOUNTER — PATIENT OUTREACH (OUTPATIENT)
Dept: OBGYN | Facility: CLINIC | Age: 60
End: 2025-06-26
Payer: COMMERCIAL

## (undated) DEVICE — ESU GROUND PAD ADULT W/CORD E7507

## (undated) DEVICE — BLADE KNIFE SURG 11 371111

## (undated) DEVICE — SUCTION TIP YANKAUER W/O VENT K86

## (undated) DEVICE — SU SILK 2-0 SH 30" K833H

## (undated) DEVICE — TUBING SUCTION 10'X3/16" N510

## (undated) DEVICE — SWAB RAYON 8" 1 TIP LG STERILE 34-7022-50

## (undated) DEVICE — GLOVE SURGICAL SIZE 6 GAMMAX NON-LATEX 8512A

## (undated) DEVICE — PAD PERI W/WINGS 1580A

## (undated) DEVICE — SOL WATER IRRIG 1000ML BOTTLE 07139-09

## (undated) DEVICE — DRSG TELFA 3X8" 1238

## (undated) DEVICE — GLOVE BIOGEL PI MICRO INDICATOR UNDERGLOVE SZ 6.0 48960

## (undated) DEVICE — ESU PENCIL W/HOLSTER

## (undated) DEVICE — APPLICATOR COTTON TIP 6"X2 STERILE LF 6012

## (undated) DEVICE — PACK MINOR SBA15MIFSE

## (undated) DEVICE — NDL SPINAL 22GA 3.5" QUINCKE 405181

## (undated) DEVICE — NDL 19GA 1.5"

## (undated) DEVICE — SU VICRYL 0 CT-1 27" UND J260H

## (undated) DEVICE — DRAPE GYN/UROLOGY FLUID POUCH TUR 29455

## (undated) DEVICE — ESU CLEANER TIP 31142717

## (undated) RX ORDER — VASOPRESSIN 20 U/ML
INJECTION PARENTERAL
Status: DISPENSED
Start: 2024-04-04

## (undated) RX ORDER — FENTANYL CITRATE 50 UG/ML
INJECTION, SOLUTION INTRAMUSCULAR; INTRAVENOUS
Status: DISPENSED
Start: 2024-04-04

## (undated) RX ORDER — ACETAMINOPHEN 325 MG/1
TABLET ORAL
Status: DISPENSED
Start: 2024-04-04

## (undated) RX ORDER — KETOROLAC TROMETHAMINE 30 MG/ML
INJECTION, SOLUTION INTRAMUSCULAR; INTRAVENOUS
Status: DISPENSED
Start: 2024-04-04

## (undated) RX ORDER — ONDANSETRON 2 MG/ML
INJECTION INTRAMUSCULAR; INTRAVENOUS
Status: DISPENSED
Start: 2024-04-04